# Patient Record
Sex: MALE | Race: WHITE | Employment: FULL TIME | URBAN - METROPOLITAN AREA
[De-identification: names, ages, dates, MRNs, and addresses within clinical notes are randomized per-mention and may not be internally consistent; named-entity substitution may affect disease eponyms.]

---

## 2020-01-10 ENCOUNTER — APPOINTMENT (OUTPATIENT)
Dept: GENERAL RADIOLOGY | Facility: CLINIC | Age: 50
DRG: 282 | End: 2020-01-10
Attending: EMERGENCY MEDICINE
Payer: COMMERCIAL

## 2020-01-10 ENCOUNTER — HOSPITAL ENCOUNTER (INPATIENT)
Facility: CLINIC | Age: 50
LOS: 1 days | Discharge: HOME | DRG: 282 | End: 2020-01-10
Attending: EMERGENCY MEDICINE | Admitting: INTERNAL MEDICINE
Payer: COMMERCIAL

## 2020-01-10 ENCOUNTER — APPOINTMENT (OUTPATIENT)
Dept: ULTRASOUND IMAGING | Facility: CLINIC | Age: 50
DRG: 282 | End: 2020-01-10
Attending: INTERNAL MEDICINE
Payer: COMMERCIAL

## 2020-01-10 ENCOUNTER — APPOINTMENT (OUTPATIENT)
Dept: CARDIOLOGY | Facility: CLINIC | Age: 50
DRG: 282 | End: 2020-01-10
Attending: INTERNAL MEDICINE
Payer: COMMERCIAL

## 2020-01-10 VITALS
HEART RATE: 75 BPM | WEIGHT: 205 LBS | SYSTOLIC BLOOD PRESSURE: 124 MMHG | HEIGHT: 68 IN | BODY MASS INDEX: 31.07 KG/M2 | OXYGEN SATURATION: 97 % | RESPIRATION RATE: 19 BRPM | TEMPERATURE: 98.7 F | DIASTOLIC BLOOD PRESSURE: 77 MMHG

## 2020-01-10 DIAGNOSIS — I25.9 CHEST PAIN DUE TO MYOCARDIAL ISCHEMIA, UNSPECIFIED ISCHEMIC CHEST PAIN TYPE: ICD-10-CM

## 2020-01-10 DIAGNOSIS — I10 ESSENTIAL HYPERTENSION: Primary | ICD-10-CM

## 2020-01-10 DIAGNOSIS — I20.0 UNSTABLE ANGINA (H): ICD-10-CM

## 2020-01-10 DIAGNOSIS — E78.5 HYPERLIPIDEMIA LDL GOAL <70: ICD-10-CM

## 2020-01-10 DIAGNOSIS — I21.4 NSTEMI (NON-ST ELEVATED MYOCARDIAL INFARCTION) (H): ICD-10-CM

## 2020-01-10 LAB
ALBUMIN SERPL-MCNC: 4.1 G/DL (ref 3.4–5)
ALP SERPL-CCNC: 86 U/L (ref 40–150)
ALT SERPL W P-5'-P-CCNC: 47 U/L (ref 0–70)
ANION GAP SERPL CALCULATED.3IONS-SCNC: 5 MMOL/L (ref 3–14)
AST SERPL W P-5'-P-CCNC: 36 U/L (ref 0–45)
BASOPHILS # BLD AUTO: 0 10E9/L (ref 0–0.2)
BASOPHILS NFR BLD AUTO: 0.3 %
BILIRUB SERPL-MCNC: 0.3 MG/DL (ref 0.2–1.3)
BUN SERPL-MCNC: 14 MG/DL (ref 7–30)
CALCIUM SERPL-MCNC: 8.9 MG/DL (ref 8.5–10.1)
CHLORIDE SERPL-SCNC: 105 MMOL/L (ref 94–109)
CHOLEST SERPL-MCNC: 247 MG/DL
CO2 SERPL-SCNC: 29 MMOL/L (ref 20–32)
CREAT SERPL-MCNC: 0.88 MG/DL (ref 0.66–1.25)
DIFFERENTIAL METHOD BLD: NORMAL
EOSINOPHIL # BLD AUTO: 0.3 10E9/L (ref 0–0.7)
EOSINOPHIL NFR BLD AUTO: 2.8 %
ERYTHROCYTE [DISTWIDTH] IN BLOOD BY AUTOMATED COUNT: 13.2 % (ref 10–15)
ERYTHROCYTE [DISTWIDTH] IN BLOOD BY AUTOMATED COUNT: 13.2 % (ref 10–15)
GFR SERPL CREATININE-BSD FRML MDRD: >90 ML/MIN/{1.73_M2}
GLUCOSE SERPL-MCNC: 98 MG/DL (ref 70–99)
HBA1C MFR BLD: 5.9 % (ref 0–5.6)
HCT VFR BLD AUTO: 41.6 % (ref 40–53)
HCT VFR BLD AUTO: 44 % (ref 40–53)
HDLC SERPL-MCNC: 44 MG/DL
HGB BLD-MCNC: 13.9 G/DL (ref 13.3–17.7)
HGB BLD-MCNC: 15 G/DL (ref 13.3–17.7)
IMM GRANULOCYTES # BLD: 0 10E9/L (ref 0–0.4)
IMM GRANULOCYTES NFR BLD: 0.3 %
INTERPRETATION ECG - MUSE: NORMAL
LDLC SERPL CALC-MCNC: 180 MG/DL
LIPASE SERPL-CCNC: 140 U/L (ref 73–393)
LMWH PPP CHRO-ACNC: 1.07 IU/ML
LYMPHOCYTES # BLD AUTO: 2.8 10E9/L (ref 0.8–5.3)
LYMPHOCYTES NFR BLD AUTO: 28.7 %
MCH RBC QN AUTO: 29.9 PG (ref 26.5–33)
MCH RBC QN AUTO: 30.4 PG (ref 26.5–33)
MCHC RBC AUTO-ENTMCNC: 33.4 G/DL (ref 31.5–36.5)
MCHC RBC AUTO-ENTMCNC: 34.1 G/DL (ref 31.5–36.5)
MCV RBC AUTO: 89 FL (ref 78–100)
MCV RBC AUTO: 90 FL (ref 78–100)
MONOCYTES # BLD AUTO: 0.9 10E9/L (ref 0–1.3)
MONOCYTES NFR BLD AUTO: 8.7 %
NEUTROPHILS # BLD AUTO: 5.8 10E9/L (ref 1.6–8.3)
NEUTROPHILS NFR BLD AUTO: 59.2 %
NONHDLC SERPL-MCNC: 203 MG/DL
NRBC # BLD AUTO: 0 10*3/UL
NRBC BLD AUTO-RTO: 0 /100
PLATELET # BLD AUTO: 250 10E9/L (ref 150–450)
PLATELET # BLD AUTO: 270 10E9/L (ref 150–450)
POTASSIUM SERPL-SCNC: 3.3 MMOL/L (ref 3.4–5.3)
PROT SERPL-MCNC: 8.1 G/DL (ref 6.8–8.8)
RBC # BLD AUTO: 4.65 10E12/L (ref 4.4–5.9)
RBC # BLD AUTO: 4.93 10E12/L (ref 4.4–5.9)
SODIUM SERPL-SCNC: 139 MMOL/L (ref 133–144)
TRIGL SERPL-MCNC: 116 MG/DL
TROPONIN I SERPL-MCNC: 2.11 UG/L (ref 0–0.04)
TROPONIN I SERPL-MCNC: 2.75 UG/L (ref 0–0.04)
TROPONIN I SERPL-MCNC: 3.08 UG/L (ref 0–0.04)
TSH SERPL DL<=0.005 MIU/L-ACNC: 1.61 MU/L (ref 0.4–4)
WBC # BLD AUTO: 10 10E9/L (ref 4–11)
WBC # BLD AUTO: 9.8 10E9/L (ref 4–11)

## 2020-01-10 PROCEDURE — 93306 TTE W/DOPPLER COMPLETE: CPT

## 2020-01-10 PROCEDURE — 99207 ZZC CDG-CODE CATEGORY CHANGED: CPT | Performed by: INTERNAL MEDICINE

## 2020-01-10 PROCEDURE — 85520 HEPARIN ASSAY: CPT | Performed by: HOSPITALIST

## 2020-01-10 PROCEDURE — 99285 EMERGENCY DEPT VISIT HI MDM: CPT | Mod: 25

## 2020-01-10 PROCEDURE — 25000125 ZZHC RX 250: Performed by: PHYSICIAN ASSISTANT

## 2020-01-10 PROCEDURE — 25000132 ZZH RX MED GY IP 250 OP 250 PS 637: Performed by: EMERGENCY MEDICINE

## 2020-01-10 PROCEDURE — 80053 COMPREHEN METABOLIC PANEL: CPT | Performed by: EMERGENCY MEDICINE

## 2020-01-10 PROCEDURE — 85025 COMPLETE CBC W/AUTO DIFF WBC: CPT | Performed by: EMERGENCY MEDICINE

## 2020-01-10 PROCEDURE — 71046 X-RAY EXAM CHEST 2 VIEWS: CPT

## 2020-01-10 PROCEDURE — 25000128 H RX IP 250 OP 636: Performed by: INTERNAL MEDICINE

## 2020-01-10 PROCEDURE — 99152 MOD SED SAME PHYS/QHP 5/>YRS: CPT | Performed by: INTERNAL MEDICINE

## 2020-01-10 PROCEDURE — C1894 INTRO/SHEATH, NON-LASER: HCPCS | Performed by: INTERNAL MEDICINE

## 2020-01-10 PROCEDURE — 93005 ELECTROCARDIOGRAM TRACING: CPT

## 2020-01-10 PROCEDURE — 93306 TTE W/DOPPLER COMPLETE: CPT | Mod: 26 | Performed by: INTERNAL MEDICINE

## 2020-01-10 PROCEDURE — 96376 TX/PRO/DX INJ SAME DRUG ADON: CPT

## 2020-01-10 PROCEDURE — 96361 HYDRATE IV INFUSION ADD-ON: CPT

## 2020-01-10 PROCEDURE — 93005 ELECTROCARDIOGRAM TRACING: CPT | Mod: 76

## 2020-01-10 PROCEDURE — 80061 LIPID PANEL: CPT | Performed by: INTERNAL MEDICINE

## 2020-01-10 PROCEDURE — 93880 EXTRACRANIAL BILAT STUDY: CPT

## 2020-01-10 PROCEDURE — 93458 L HRT ARTERY/VENTRICLE ANGIO: CPT | Performed by: INTERNAL MEDICINE

## 2020-01-10 PROCEDURE — 25000132 ZZH RX MED GY IP 250 OP 250 PS 637: Performed by: INTERNAL MEDICINE

## 2020-01-10 PROCEDURE — 93571 IV DOP VEL&/PRESS C FLO 1ST: CPT | Mod: 52 | Performed by: INTERNAL MEDICINE

## 2020-01-10 PROCEDURE — 84484 ASSAY OF TROPONIN QUANT: CPT | Performed by: EMERGENCY MEDICINE

## 2020-01-10 PROCEDURE — 25800030 ZZH RX IP 258 OP 636: Performed by: PHYSICIAN ASSISTANT

## 2020-01-10 PROCEDURE — 25500064 ZZH RX 255 OP 636: Performed by: INTERNAL MEDICINE

## 2020-01-10 PROCEDURE — 93572 IV DOP VEL&/PRESS C FLO EA: CPT | Performed by: INTERNAL MEDICINE

## 2020-01-10 PROCEDURE — 4A033BC MEASUREMENT OF ARTERIAL PRESSURE, CORONARY, PERCUTANEOUS APPROACH: ICD-10-PCS | Performed by: INTERNAL MEDICINE

## 2020-01-10 PROCEDURE — B2151ZZ FLUOROSCOPY OF LEFT HEART USING LOW OSMOLAR CONTRAST: ICD-10-PCS | Performed by: INTERNAL MEDICINE

## 2020-01-10 PROCEDURE — 25000125 ZZHC RX 250: Performed by: INTERNAL MEDICINE

## 2020-01-10 PROCEDURE — 4A023N7 MEASUREMENT OF CARDIAC SAMPLING AND PRESSURE, LEFT HEART, PERCUTANEOUS APPROACH: ICD-10-PCS | Performed by: INTERNAL MEDICINE

## 2020-01-10 PROCEDURE — 99235 HOSP IP/OBS SAME DATE MOD 70: CPT | Performed by: INTERNAL MEDICINE

## 2020-01-10 PROCEDURE — 36415 COLL VENOUS BLD VENIPUNCTURE: CPT | Performed by: INTERNAL MEDICINE

## 2020-01-10 PROCEDURE — 27210794 ZZH OR GENERAL SUPPLY STERILE: Performed by: INTERNAL MEDICINE

## 2020-01-10 PROCEDURE — 99222 1ST HOSP IP/OBS MODERATE 55: CPT | Mod: 25 | Performed by: INTERNAL MEDICINE

## 2020-01-10 PROCEDURE — 85027 COMPLETE CBC AUTOMATED: CPT | Performed by: INTERNAL MEDICINE

## 2020-01-10 PROCEDURE — 83690 ASSAY OF LIPASE: CPT | Performed by: EMERGENCY MEDICINE

## 2020-01-10 PROCEDURE — 93010 ELECTROCARDIOGRAM REPORT: CPT | Performed by: INTERNAL MEDICINE

## 2020-01-10 PROCEDURE — 84443 ASSAY THYROID STIM HORMONE: CPT | Performed by: INTERNAL MEDICINE

## 2020-01-10 PROCEDURE — 25000132 ZZH RX MED GY IP 250 OP 250 PS 637: Performed by: PHYSICIAN ASSISTANT

## 2020-01-10 PROCEDURE — B2111ZZ FLUOROSCOPY OF MULTIPLE CORONARY ARTERIES USING LOW OSMOLAR CONTRAST: ICD-10-PCS | Performed by: INTERNAL MEDICINE

## 2020-01-10 PROCEDURE — C1769 GUIDE WIRE: HCPCS | Performed by: INTERNAL MEDICINE

## 2020-01-10 PROCEDURE — 4A0335C MEASUREMENT OF ARTERIAL FLOW, CORONARY, PERCUTANEOUS APPROACH: ICD-10-PCS | Performed by: INTERNAL MEDICINE

## 2020-01-10 PROCEDURE — 83036 HEMOGLOBIN GLYCOSYLATED A1C: CPT | Performed by: INTERNAL MEDICINE

## 2020-01-10 PROCEDURE — 21000000 ZZH R&B IMCU HEART CARE

## 2020-01-10 PROCEDURE — 25800030 ZZH RX IP 258 OP 636: Performed by: EMERGENCY MEDICINE

## 2020-01-10 PROCEDURE — 99153 MOD SED SAME PHYS/QHP EA: CPT

## 2020-01-10 PROCEDURE — 96365 THER/PROPH/DIAG IV INF INIT: CPT

## 2020-01-10 PROCEDURE — 25800030 ZZH RX IP 258 OP 636: Performed by: INTERNAL MEDICINE

## 2020-01-10 PROCEDURE — 84484 ASSAY OF TROPONIN QUANT: CPT | Performed by: INTERNAL MEDICINE

## 2020-01-10 PROCEDURE — 36415 COLL VENOUS BLD VENIPUNCTURE: CPT | Performed by: HOSPITALIST

## 2020-01-10 PROCEDURE — 25000128 H RX IP 250 OP 636: Performed by: EMERGENCY MEDICINE

## 2020-01-10 RX ORDER — NALOXONE HYDROCHLORIDE 0.4 MG/ML
.1-.4 INJECTION, SOLUTION INTRAMUSCULAR; INTRAVENOUS; SUBCUTANEOUS
Status: DISCONTINUED | OUTPATIENT
Start: 2020-01-10 | End: 2020-01-10 | Stop reason: HOSPADM

## 2020-01-10 RX ORDER — LORAZEPAM 2 MG/ML
0.5 INJECTION INTRAMUSCULAR
Status: DISCONTINUED | OUTPATIENT
Start: 2020-01-10 | End: 2020-01-10

## 2020-01-10 RX ORDER — PROCHLORPERAZINE 25 MG
25 SUPPOSITORY, RECTAL RECTAL EVERY 12 HOURS PRN
Status: DISCONTINUED | OUTPATIENT
Start: 2020-01-10 | End: 2020-01-10 | Stop reason: HOSPADM

## 2020-01-10 RX ORDER — ASPIRIN 81 MG/1
81 TABLET ORAL DAILY
Status: DISCONTINUED | OUTPATIENT
Start: 2020-01-10 | End: 2020-01-10 | Stop reason: HOSPADM

## 2020-01-10 RX ORDER — SODIUM CHLORIDE 9 MG/ML
INJECTION, SOLUTION INTRAVENOUS CONTINUOUS
Status: DISCONTINUED | OUTPATIENT
Start: 2020-01-10 | End: 2020-01-10 | Stop reason: HOSPADM

## 2020-01-10 RX ORDER — LISINOPRIL 10 MG/1
10 TABLET ORAL DAILY
Qty: 30 TABLET | Refills: 0 | Status: SHIPPED | OUTPATIENT
Start: 2020-01-11 | End: 2020-01-22

## 2020-01-10 RX ORDER — ROSUVASTATIN CALCIUM 20 MG/1
20 TABLET, COATED ORAL DAILY
Status: DISCONTINUED | OUTPATIENT
Start: 2020-01-10 | End: 2020-01-10

## 2020-01-10 RX ORDER — OXYCODONE HYDROCHLORIDE 5 MG/1
5-10 TABLET ORAL
Status: DISCONTINUED | OUTPATIENT
Start: 2020-01-10 | End: 2020-01-10 | Stop reason: HOSPADM

## 2020-01-10 RX ORDER — ONDANSETRON 4 MG/1
4 TABLET, ORALLY DISINTEGRATING ORAL EVERY 6 HOURS PRN
Status: DISCONTINUED | OUTPATIENT
Start: 2020-01-10 | End: 2020-01-10 | Stop reason: HOSPADM

## 2020-01-10 RX ORDER — NITROGLYCERIN 0.4 MG/1
TABLET SUBLINGUAL
Qty: 20 TABLET | Refills: 0 | Status: SHIPPED | OUTPATIENT
Start: 2020-01-10

## 2020-01-10 RX ORDER — CLOPIDOGREL BISULFATE 75 MG/1
TABLET ORAL
Status: DISCONTINUED | OUTPATIENT
Start: 2020-01-10 | End: 2020-01-10

## 2020-01-10 RX ORDER — HEPARIN SODIUM 10000 [USP'U]/100ML
950 INJECTION, SOLUTION INTRAVENOUS CONTINUOUS
Status: DISCONTINUED | OUTPATIENT
Start: 2020-01-10 | End: 2020-01-10

## 2020-01-10 RX ORDER — LISINOPRIL 10 MG/1
10 TABLET ORAL DAILY
Status: DISCONTINUED | OUTPATIENT
Start: 2020-01-10 | End: 2020-01-10 | Stop reason: HOSPADM

## 2020-01-10 RX ORDER — ATORVASTATIN CALCIUM 80 MG/1
80 TABLET, FILM COATED ORAL EVERY EVENING
Status: DISCONTINUED | OUTPATIENT
Start: 2020-01-10 | End: 2020-01-10

## 2020-01-10 RX ORDER — FENTANYL CITRATE 50 UG/ML
25-50 INJECTION, SOLUTION INTRAMUSCULAR; INTRAVENOUS
Status: DISCONTINUED | OUTPATIENT
Start: 2020-01-10 | End: 2020-01-10 | Stop reason: HOSPADM

## 2020-01-10 RX ORDER — CLOPIDOGREL BISULFATE 75 MG/1
75 TABLET ORAL DAILY
Qty: 30 TABLET | Refills: 0 | Status: SHIPPED | OUTPATIENT
Start: 2020-01-11 | End: 2020-01-22

## 2020-01-10 RX ORDER — LIDOCAINE 40 MG/G
CREAM TOPICAL
Status: DISCONTINUED | OUTPATIENT
Start: 2020-01-10 | End: 2020-01-10 | Stop reason: HOSPADM

## 2020-01-10 RX ORDER — LORAZEPAM 0.5 MG/1
0.5 TABLET ORAL
Status: DISCONTINUED | OUTPATIENT
Start: 2020-01-10 | End: 2020-01-10

## 2020-01-10 RX ORDER — NITROGLYCERIN 5 MG/ML
VIAL (ML) INTRAVENOUS
Status: DISCONTINUED | OUTPATIENT
Start: 2020-01-10 | End: 2020-01-10

## 2020-01-10 RX ORDER — SIMVASTATIN 40 MG
40 TABLET ORAL EVERY EVENING
Status: DISCONTINUED | OUTPATIENT
Start: 2020-01-10 | End: 2020-01-10

## 2020-01-10 RX ORDER — FLUMAZENIL 0.1 MG/ML
0.2 INJECTION, SOLUTION INTRAVENOUS
Status: DISCONTINUED | OUTPATIENT
Start: 2020-01-10 | End: 2020-01-10 | Stop reason: HOSPADM

## 2020-01-10 RX ORDER — ACETAMINOPHEN 650 MG/1
650 SUPPOSITORY RECTAL EVERY 4 HOURS PRN
Status: DISCONTINUED | OUTPATIENT
Start: 2020-01-10 | End: 2020-01-10 | Stop reason: HOSPADM

## 2020-01-10 RX ORDER — ALUMINA, MAGNESIA, AND SIMETHICONE 2400; 2400; 240 MG/30ML; MG/30ML; MG/30ML
30 SUSPENSION ORAL EVERY 4 HOURS PRN
Status: DISCONTINUED | OUTPATIENT
Start: 2020-01-10 | End: 2020-01-10 | Stop reason: HOSPADM

## 2020-01-10 RX ORDER — IOPAMIDOL 755 MG/ML
INJECTION, SOLUTION INTRAVASCULAR
Status: DISCONTINUED | OUTPATIENT
Start: 2020-01-10 | End: 2020-01-10 | Stop reason: HOSPADM

## 2020-01-10 RX ORDER — NALOXONE HYDROCHLORIDE 0.4 MG/ML
.1-.4 INJECTION, SOLUTION INTRAMUSCULAR; INTRAVENOUS; SUBCUTANEOUS
Status: DISCONTINUED | OUTPATIENT
Start: 2020-01-10 | End: 2020-01-10

## 2020-01-10 RX ORDER — ONDANSETRON 2 MG/ML
4 INJECTION INTRAMUSCULAR; INTRAVENOUS EVERY 6 HOURS PRN
Status: DISCONTINUED | OUTPATIENT
Start: 2020-01-10 | End: 2020-01-10 | Stop reason: HOSPADM

## 2020-01-10 RX ORDER — ACETAMINOPHEN 325 MG/1
650 TABLET ORAL EVERY 4 HOURS PRN
Status: DISCONTINUED | OUTPATIENT
Start: 2020-01-10 | End: 2020-01-10

## 2020-01-10 RX ORDER — HEPARIN SODIUM 1000 [USP'U]/ML
INJECTION, SOLUTION INTRAVENOUS; SUBCUTANEOUS
Status: DISCONTINUED | OUTPATIENT
Start: 2020-01-10 | End: 2020-01-10

## 2020-01-10 RX ORDER — POTASSIUM CHLORIDE 1500 MG/1
20 TABLET, EXTENDED RELEASE ORAL
Status: DISCONTINUED | OUTPATIENT
Start: 2020-01-10 | End: 2020-01-10

## 2020-01-10 RX ORDER — BISACODYL 10 MG
10 SUPPOSITORY, RECTAL RECTAL DAILY PRN
Status: DISCONTINUED | OUTPATIENT
Start: 2020-01-10 | End: 2020-01-10 | Stop reason: HOSPADM

## 2020-01-10 RX ORDER — NITROGLYCERIN 0.4 MG/1
0.4 TABLET SUBLINGUAL EVERY 5 MIN PRN
Status: DISCONTINUED | OUTPATIENT
Start: 2020-01-10 | End: 2020-01-10 | Stop reason: HOSPADM

## 2020-01-10 RX ORDER — ATROPINE SULFATE 0.1 MG/ML
0.5 INJECTION INTRAVENOUS EVERY 5 MIN PRN
Status: DISCONTINUED | OUTPATIENT
Start: 2020-01-10 | End: 2020-01-10 | Stop reason: HOSPADM

## 2020-01-10 RX ORDER — AMOXICILLIN 250 MG
1 CAPSULE ORAL 2 TIMES DAILY PRN
Status: DISCONTINUED | OUTPATIENT
Start: 2020-01-10 | End: 2020-01-10 | Stop reason: HOSPADM

## 2020-01-10 RX ORDER — ACETAMINOPHEN 325 MG/1
650 TABLET ORAL EVERY 4 HOURS PRN
Status: DISCONTINUED | OUTPATIENT
Start: 2020-01-10 | End: 2020-01-10 | Stop reason: HOSPADM

## 2020-01-10 RX ORDER — FENTANYL CITRATE 50 UG/ML
INJECTION, SOLUTION INTRAMUSCULAR; INTRAVENOUS
Status: DISCONTINUED | OUTPATIENT
Start: 2020-01-10 | End: 2020-01-10

## 2020-01-10 RX ORDER — NALOXONE HYDROCHLORIDE 0.4 MG/ML
.2-.4 INJECTION, SOLUTION INTRAMUSCULAR; INTRAVENOUS; SUBCUTANEOUS
Status: DISCONTINUED | OUTPATIENT
Start: 2020-01-10 | End: 2020-01-10

## 2020-01-10 RX ORDER — ASPIRIN 81 MG/1
81 TABLET, CHEWABLE ORAL DAILY
Status: DISCONTINUED | OUTPATIENT
Start: 2020-01-10 | End: 2020-01-10

## 2020-01-10 RX ORDER — VERAPAMIL HYDROCHLORIDE 2.5 MG/ML
INJECTION, SOLUTION INTRAVENOUS
Status: DISCONTINUED | OUTPATIENT
Start: 2020-01-10 | End: 2020-01-10

## 2020-01-10 RX ORDER — LIDOCAINE 40 MG/G
CREAM TOPICAL
Status: DISCONTINUED | OUTPATIENT
Start: 2020-01-10 | End: 2020-01-10

## 2020-01-10 RX ORDER — ROSUVASTATIN CALCIUM 20 MG/1
40 TABLET, COATED ORAL DAILY
Status: DISCONTINUED | OUTPATIENT
Start: 2020-01-10 | End: 2020-01-10 | Stop reason: HOSPADM

## 2020-01-10 RX ORDER — PROCHLORPERAZINE MALEATE 10 MG
10 TABLET ORAL EVERY 6 HOURS PRN
Status: DISCONTINUED | OUTPATIENT
Start: 2020-01-10 | End: 2020-01-10 | Stop reason: HOSPADM

## 2020-01-10 RX ORDER — AMOXICILLIN 250 MG
2 CAPSULE ORAL 2 TIMES DAILY PRN
Status: DISCONTINUED | OUTPATIENT
Start: 2020-01-10 | End: 2020-01-10 | Stop reason: HOSPADM

## 2020-01-10 RX ORDER — METOPROLOL SUCCINATE 50 MG/1
50 TABLET, EXTENDED RELEASE ORAL DAILY
Status: DISCONTINUED | OUTPATIENT
Start: 2020-01-10 | End: 2020-01-10 | Stop reason: HOSPADM

## 2020-01-10 RX ORDER — POLYETHYLENE GLYCOL 3350 17 G/17G
17 POWDER, FOR SOLUTION ORAL DAILY PRN
Status: DISCONTINUED | OUTPATIENT
Start: 2020-01-10 | End: 2020-01-10 | Stop reason: HOSPADM

## 2020-01-10 RX ORDER — ASPIRIN 81 MG/1
324 TABLET, CHEWABLE ORAL ONCE
Status: DISCONTINUED | OUTPATIENT
Start: 2020-01-10 | End: 2020-01-10

## 2020-01-10 RX ORDER — METOPROLOL SUCCINATE 50 MG/1
50 TABLET, EXTENDED RELEASE ORAL DAILY
Qty: 30 TABLET | Refills: 0 | Status: SHIPPED | OUTPATIENT
Start: 2020-01-11 | End: 2020-01-22

## 2020-01-10 RX ORDER — LISINOPRIL 5 MG/1
5 TABLET ORAL DAILY
Status: DISCONTINUED | OUTPATIENT
Start: 2020-01-10 | End: 2020-01-10

## 2020-01-10 RX ORDER — NITROGLYCERIN 0.4 MG/1
0.4 TABLET SUBLINGUAL EVERY 5 MIN PRN
Status: DISCONTINUED | OUTPATIENT
Start: 2020-01-10 | End: 2020-01-10

## 2020-01-10 RX ORDER — ROSUVASTATIN CALCIUM 40 MG/1
40 TABLET, COATED ORAL DAILY
Qty: 30 TABLET | Refills: 0 | Status: SHIPPED | OUTPATIENT
Start: 2020-01-11 | End: 2020-01-22

## 2020-01-10 RX ORDER — CLOPIDOGREL BISULFATE 75 MG/1
75 TABLET ORAL DAILY
Status: DISCONTINUED | OUTPATIENT
Start: 2020-01-11 | End: 2020-01-10 | Stop reason: HOSPADM

## 2020-01-10 RX ORDER — POTASSIUM CHLORIDE 1.5 G/1.58G
20 POWDER, FOR SOLUTION ORAL ONCE
Status: COMPLETED | OUTPATIENT
Start: 2020-01-10 | End: 2020-01-10

## 2020-01-10 RX ORDER — METOPROLOL SUCCINATE 25 MG/1
25 TABLET, EXTENDED RELEASE ORAL DAILY
Status: DISCONTINUED | OUTPATIENT
Start: 2020-01-10 | End: 2020-01-10

## 2020-01-10 RX ORDER — SODIUM CHLORIDE 9 MG/ML
INJECTION, SOLUTION INTRAVENOUS CONTINUOUS
Status: DISCONTINUED | OUTPATIENT
Start: 2020-01-10 | End: 2020-01-10

## 2020-01-10 RX ADMIN — SODIUM CHLORIDE 1000 ML: 9 INJECTION, SOLUTION INTRAVENOUS at 02:16

## 2020-01-10 RX ADMIN — METOPROLOL SUCCINATE 50 MG: 50 TABLET, EXTENDED RELEASE ORAL at 12:06

## 2020-01-10 RX ADMIN — NITROGLYCERIN 0.4 MG: 0.4 TABLET SUBLINGUAL at 02:08

## 2020-01-10 RX ADMIN — POTASSIUM CHLORIDE 20 MEQ: 1.5 POWDER, FOR SOLUTION ORAL at 02:34

## 2020-01-10 RX ADMIN — Medication 4500 UNITS: at 03:01

## 2020-01-10 RX ADMIN — ASPIRIN 325 MG: 325 TABLET, COATED ORAL at 08:53

## 2020-01-10 RX ADMIN — HUMAN ALBUMIN MICROSPHERES AND PERFLUTREN 9 ML: 10; .22 INJECTION, SOLUTION INTRAVENOUS at 14:23

## 2020-01-10 RX ADMIN — ROSUVASTATIN CALCIUM 40 MG: 20 TABLET, FILM COATED ORAL at 12:06

## 2020-01-10 RX ADMIN — SODIUM CHLORIDE: 9 INJECTION, SOLUTION INTRAVENOUS at 08:54

## 2020-01-10 RX ADMIN — NITROGLYCERIN 0.4 MG: 0.4 TABLET SUBLINGUAL at 02:03

## 2020-01-10 RX ADMIN — LISINOPRIL 10 MG: 10 TABLET ORAL at 12:06

## 2020-01-10 RX ADMIN — HEPARIN SODIUM 950 UNITS/HR: 10000 INJECTION, SOLUTION INTRAVENOUS at 03:06

## 2020-01-10 ASSESSMENT — ENCOUNTER SYMPTOMS
FEVER: 0
COUGH: 0
CHEST TIGHTNESS: 1
NAUSEA: 0
RHINORRHEA: 0
SHORTNESS OF BREATH: 0
DIAPHORESIS: 0

## 2020-01-10 ASSESSMENT — ACTIVITIES OF DAILY LIVING (ADL)
ADLS_ACUITY_SCORE: 15

## 2020-01-10 ASSESSMENT — MIFFLIN-ST. JEOR: SCORE: 1774.37

## 2020-01-10 NOTE — CONSULTS
[facility] Cardiology Consultation     Date of Admission:  1/10/2020  Date of Consult: 01/10/20  Requesting Physician: Dr. Lorenzana  Primary care physician: Physician No Ref-Primary  Primary cardiologist: None  Staff Cardiologist:  Dr. Helton     Reason for consult: NSTEMI    Assessment:   Stepan Pablo is a 49 year old male who was admitted on 1/10/2020 with chest pain typical for unstable angina and troponin rise up to 3 this morning.    1. NSTEMI - no known CAD. Initial EKG with ST depression in lead III alone. Currently pain-free following SL NTG on arrival. Remains on heparin drip. TTE pending.   2. Hypertension - reports controlled as outpatient; elevated here.  3. Hyperlipidemia - , untreated.   4. Obesity - Body mass index is 31.17 kg/m .   5. Hypokalemia - 3.3 this AM, being replaced.   6. Never-smoker.     Plan:  1. LHC, possible PCI recommended. Risks and benefits of left heart catheterization and coronary angiogram were discussed with the patient in detail. 0.1-0.3% (for diagnostic angio) and 1-2% (for PCI)  risk of stroke, MI, death, emergent bypass for diagnostic angio, risk of contrast induced allergic reaction, renal dysfunction, vascular complications were discussed. Patient understands and wishes to proceed with it.  Further recommendations based on the results of coronary angiography. The patient would be an appropriate candidate for DAPT, if required.   2. Continue aspirin, beta blocker, ACEi, high intensity statin. Stop heparin on arrival to cath lab.   3. Repeat EKG now. Trend trop to peak.  4. Will review TTE once available.   5. Carotid ultrasound given soft bilateral bruits.   6. Up-titrate lisinopril as needed for BP control.   7. Will arrange follow up with our clinic.    Thank you very much for this consultation.     Yasemin Pinzon PA-C  [facility]  Pager: [phone]  Text Page  (7:30am - 4pm M-F)  "  ________________________________________________________________________  History of Present Illness   Stepan Pablo is a 49 year old male with a PMH of hypertension treated with amlodipine, who presents with acute-onset chest pain initiating yesterday while playing hockey with his children. He suddenly developed a L-sided chest tightness which radiated down his left arm, rated a 5/10 in severity. Denies associated symptoms. This slowly diminished over a period of hours however when it persisted at a low level throughout the night he sought care in the ED. Troponin was initially elevated at 2.1, and hector to 3 approximately four hours later. The remainder of his labs were notable for a potassium of 3.3, and an LDL of 180. The only EKG I can see in the chart has some ST depression/TW inversion in lead III alone, otherwise unremarkable. His pain resolved completely after SL NTG x 2. He was placed on a heparin drip, beta blocker, ACEI, statin and aspirin.     He is currently comfortable in bed. His pain has not returned. He states he had some \"twinges\" in his chest on New Year's Day, associated with anxiety, but otherwise denies a history of chest pain. He denies history of kidney disease, diabetes, or GI bleeds. He has never used tobacco. He has no contributory family history. He lives at home with his wife and two children, and is active at baseline.    Code Status    Full Code    Past Medical History   I have reviewed this patient's medical history and updated it with pertinent information if needed.   Past Medical History:   Diagnosis Date     Hyperlipidemia      Hypertension        Past Surgical History   I have reviewed this patient's surgical history and updated it with pertinent information if needed.  Past Surgical History:   Procedure Laterality Date     ENT SURGERY      abcess in jaw and teeth pulled     HERNIORRHAPHY UMBILICAL N/A 10/12/2016    Procedure: HERNIORRHAPHY UMBILICAL;  Surgeon: Parker Alaniz " MD Raul;  Location:  OR       Prior to Admission Medications   Prior to Admission Medications   Prescriptions Last Dose Informant Patient Reported? Taking?   AMLODIPINE BESYLATE PO   Yes No   Sig: Take 5 mg by mouth   ATORVASTATIN CALCIUM PO   Yes No   Sig: Take 20 mg by mouth daily   FLONASE INHA 50 MCG/DOSE NA   Yes No   Sig: None Entered   Fexofenadine HCl (ALLEGRA PO)   Yes No   HYDROcodone-acetaminophen (NORCO) 5-325 MG per tablet   No No   Sig: Take 1-2 tablets by mouth every 4 hours as needed for other (Moderate to Severe Pain)      Facility-Administered Medications: None     Allergies   No Known Allergies    Social History   I have reviewed this patient's social history and updated it with pertinent information if needed. Stepan Pablo  reports that he has never smoked. He has never used smokeless tobacco. He reports current alcohol use. He reports that he does not use drugs.    Family History   I have reviewed this patient's family history and updated it with pertinent information if needed.   Family History   Problem Relation Age of Onset     Thyroid Disease Sister      Heart Disease Maternal Grandmother        Review of Systems   The 10 point Review of Systems is negative other than noted in the HPI or here.     Physical Exam   Temp: 98.6  F (37  C) Temp src: Oral BP: 138/78 Pulse: 81 Heart Rate: 71 Resp: 18 SpO2: 96 % O2 Device: None (Room air)    Vital Signs with Ranges  Temp:  [97.5  F (36.4  C)-98.6  F (37  C)] 98.6  F (37  C)  Pulse:  [48-86] 81  Heart Rate:  [50-84] 71  Resp:  [5-25] 18  BP: (110-188)/() 138/78  SpO2:  [95 %-100 %] 96 %  205 lbs 0 oz    GENERAL:  Alert, oriented, in no apparent distress.   HEENT: NC/AT, sclera non-icteric, teeth in normal repair   NECK: CVP appears normal. Soft bilateral carotid bruits are appreciated.   PULMONARY:  There is a normal respiratory effort. Clear lungs to auscultation bilaterally.   CHEST: Non-tender, normal A/P diameter  CARDIOVASCULAR:   RRR, normal S1 S2, no m/r/g,   GI:  Non tender abdomen with normoactive bowel sounds and no hepatosplenomegaly. There are no masses palpable.   EXTREMITIES:  No clubbing, cyanosis or edema  VASCULAR: 2+ Pulses bilaterally in upper and lower extremities   NEURO: No gross motor or sensory deficits.   PSYCH: Appropriate affect  DERM: No rashes or lesions    Data   Most Recent 3 CBC's:  Recent Labs   Lab Test 01/10/20  0509 01/10/20  0153   WBC 10.0 9.8   HGB 13.9 15.0   MCV 90 89    270     Most Recent 3 BMP's:  Recent Labs   Lab Test 01/10/20  0153 10/12/16  0954     --    POTASSIUM 3.3*  --    CHLORIDE 105  --    CO2 29  --    BUN 14  --    CR 0.88 0.97   ANIONGAP 5  --    NANDINI 8.9  --    GLC 98  --      Most Recent 3 Troponin's:  Recent Labs   Lab Test 01/10/20  0509 01/10/20  0153   TROPI 3.085* 2.113*     Most Recent 3 BNP's:No lab results found.  Most Recent Cholesterol Panel:  Recent Labs   Lab Test 01/10/20  0509   CHOL 247*   *   HDL 44   TRIG 116     Most Recent TSH and T4:  Recent Labs   Lab Test 01/10/20  0509   TSH 1.61     Most Recent Hemoglobin A1c:  Recent Labs   Lab Test 01/10/20  0509   A1C 5.9*

## 2020-01-10 NOTE — PLAN OF CARE
A&Ox4. Denies chest pain/SOB/dizziness. Tele NSR. Regular diet, tolerating. Echo completed. L radial site, WDL. Air in band currently at 2mL. Wife at bedside.  Nursing will continue to monitor.

## 2020-01-10 NOTE — PROGRESS NOTES
Hospitalist brief udpate note:    Patient was seen and examined after he came back from angiogram. Denies chest pain or dyspnea.   Angiogram report noted, small vessel disease, no stents placed, cardiology recommending medical management.  On ASA, Plavix, BB ACEI and statin.  Discharge when ok with cardiology.    Discharge Med Rec completed.   Discussed with patient and his wife.    Hernan Collazo MD  Hospitalist.

## 2020-01-10 NOTE — PROVIDER NOTIFICATION
MD Notification    Notified Person: MD    Notified Person Name: Dr. Collazo    Notification Date/Time: 1/10/2020 at 1218     Notification Interaction: Web page    Purpose of Notification: Hep 10A 1.07 after bolus during angio. Heparin stopped this AM at 0900. Please advise.    Orders Received: Per MD, no further intervention needed at this time.    Comments:

## 2020-01-10 NOTE — PROGRESS NOTES
RECEIVING UNIT ED HANDOFF REVIEW    ED Nurse Handoff Report was reviewed by: Geneva Blackman RN on January 10, 2020 at 3:00 AM

## 2020-01-10 NOTE — PHARMACY-ADMISSION MEDICATION HISTORY
Pharmacy Medication History  Admission medication history interview status for the 1/10/2020  admission is complete. See EPIC admission navigator for prior to admission medications     Medication history sources: Patient and Care Everywhere  Medication history source reliability: Good  Adherence assessment: Good    Significant changes made to the medication list:  Removed atorvastatin      Additional medication history information:   none    Medication reconciliation completed by provider prior to medication history? Yes    Time spent in this activity: 10 min      Prior to Admission medications    Medication Sig Last Dose Taking? Auth Provider   AMLODIPINE BESYLATE PO Take 5 mg by mouth At Bedtime  1/8/2020 Yes Reported, Patient   Fexofenadine HCl (ALLEGRA PO) Take 180 mg by mouth At Bedtime  1/8/2020 Yes Reported, Patient   FLONASE INHA 50 MCG/DOSE NA Spray 1 spray in nostril At Bedtime  1/8/2020 at Unknown time Yes Reported, Patient   ketotifen (ZADITOR/REFRESH ANTI-ITCH) 0.025 % ophthalmic solution Place 1 drop into both eyes At Bedtime 1/8/2020 Yes Unknown, Entered By History

## 2020-01-10 NOTE — H&P
[facility]    History and Physical - Hospitalist Service       Date of Admission:  1/10/2020    Assessment & Plan   Stepan Pablo is a 49 year old male admitted on 1/10/2020. He presents to the emergency department for evaluation of persistent chest tightness and left arm aching after strenuous physical activity.  Found to have an elevated troponin and relief of chest discomfort with nitroglycerin.    Non-ST elevation myocardial infarction: Suspect plaque rupture with incomplete occlusion given patient's history of hyperlipidemia, no prior exertional symptoms historically.  -Completing troponin trend  -TTE pending  -Cardiology consulted  -N.p.o. anticipating coronary angiography later today  -Heparin drip  -Daily 81 mg aspirin.  Patient took 325 mg prior to presentation  -Cardiac telemetry  -Lipid panel, hemoglobin A1c, TSH pending  -Simvastatin 40 mg daily  -Discontinuing prior to admission amlodipine in favor of up titration of metoprolol 50 mg XL daily and lisinopril 10 mg daily    Essential hypertension: Managed outpatient with amlodipine, and currently well controlled.  -As above, discontinuing prior to patient amlodipine in favor of up titration of metoprolol and lisinopril as able.  This was discussed with patient on admission    Hyperlipidemia:   -repeat lipid panel pending  -Simvastatin 40 mg daily       Diet: N.p.o. per anesthesia guidelines  DVT Prophylaxis: Heparin drip  Pugh Catheter: not present  Code Status: Full code    Disposition Plan   Expected discharge: Tomorrow, recommended to prior living arrangement once Cardiac work-up complete.  Entered: Joseph Lorenzana MD 01/10/2020, 3:35 AM     The patient's care was discussed with the Patient and Dr. iDaz in the emergency department.    Joseph Lorenzana MD  [facility]    ______________________________________________________________________    Chief Complaint   Chest tightness, left arm aching    History is  obtained from patient, chart review, discussion with Dr. Diaz in the emergency department, review of outside records including history of vasovagal syncope in the setting of volume depletion with OMFS drain removal    History of Present Illness   Stepan Pablo is a 49 year old male who presents to the emergency department for assessment of central chest tightness and aching pain in his left arm and shoulder precipitated by vigorous activity and is found to have an elevated troponin concerning for non-ST elevation myocardial infarction.    Tonight, after vigorously ice skating with his daughter while his sons played hockey, patient developed central chest tightness and left arm aching.  He did not feel overly short of breath.  He was sweaty, though thought this was secondary to his degree of exertion.  This occurred around 6:30 PM, and symptoms persisted despite stopping ice-skating to watch the hockey game/practice.  When symptoms continued to persist at home, patient took 325 mg of aspirin, and presented to the emergency department for evaluation where he was found to have an elevated troponin concerning for non-ST elevation myocardial infarction.  EKG demonstrated T wave inversion in III which has been present historically, flat/inverted T wave in V1 and otherwise normal sinus rhythm.     Patient has no personal history of heart disease, though does have a history of hypertension on amlodipine as well as hyperlipidemia for which he had historically been on simvastatin therapy and was subsequently taken off.  Is a non-smoker, no history of diabetes.  He reports that he has a family history of early heart disease in his paternal grandfather, who  at age 50 of a heart attack.  Patient drinks alcohol, tells me he drinks approximately 10 beers per week.  No history of withdrawal.    Patient is a , not particularly active at baseline since the summer.  In the summer months, however, he would  take his dogs for a brisk walk/jog daily for approximately 1 hour, covering several miles with this.  No chest pain with this, though would become diaphoretic from the degree of exercise.  Much less active over the fall and winter, essentially telling me that his most vigorous activity is walking the aisles of the grocery store when he shops.    In the emergency department, patient received 2 doses of nitroglycerin.  Following this, his chest pain resolved, though he became presyncopal with a drop in his blood pressure.    Review of Systems    The 10 point Review of Systems is negative other than noted in the HPI or here.  No fevers or chills  Has been feeling in his usual state of health and preceding days  No prior history of similar chest discomfort with exertion  No shortness of breath    Past Medical History    I have reviewed this patient's medical history and updated it with pertinent information if needed.   Past Medical History:   Diagnosis Date     Hyperlipidemia      Hypertension        Past Surgical History   I have reviewed this patient's surgical history and updated it with pertinent information if needed.  Past Surgical History:   Procedure Laterality Date     ENT SURGERY      abcess in jaw and teeth pulled     HERNIORRHAPHY UMBILICAL N/A 10/12/2016    Procedure: HERNIORRHAPHY UMBILICAL;  Surgeon: Parker Alaniz MD;  Location:  OR       Social History   I have reviewed this patient's social history and updated it with pertinent information if needed.  Social History     Tobacco Use     Smoking status: Never Smoker     Smokeless tobacco: Never Used   Substance Use Topics     Alcohol use: Yes     Comment: occ     Drug use: No       Family History   I have reviewed this patient's family history and updated it with pertinent information if needed.   Family History   Problem Relation Age of Onset     Thyroid Disease Sister      Heart Disease Maternal Grandmother    Paternal grandfather with  myocardial infarction at age 50    Prior to Admission Medications   Prior to Admission Medications   Prescriptions Last Dose Informant Patient Reported? Taking?   AMLODIPINE BESYLATE PO   Yes No   Sig: Take 5 mg by mouth   ATORVASTATIN CALCIUM PO   Yes No   Sig: Take 20 mg by mouth daily   FLONASE INHA 50 MCG/DOSE NA   Yes No   Sig: None Entered   Fexofenadine HCl (ALLEGRA PO)   Yes No   HYDROcodone-acetaminophen (NORCO) 5-325 MG per tablet   No No   Sig: Take 1-2 tablets by mouth every 4 hours as needed for other (Moderate to Severe Pain)      Facility-Administered Medications: None     Allergies   No Known Allergies    Physical Exam   Vital Signs: Temp: 97.5  F (36.4  C) Temp src: Temporal BP: (!) 148/91 Pulse: 61 Heart Rate: 80 Resp: 18 SpO2: 96 % O2 Device: None (Room air)    Weight: 205 lbs 0 oz    General Appearance: Well-appearing middle-aged male resting comfortably on hospitals  Eyes: No scleral icterus or injection  HEENT: Normocephalic  Respiratory: Breath sounds are pristine bilaterally without wheeze or crackles.  Good effort.  Cardiovascular: Regular rate and rhythm, no appreciable murmur.  Lymph/Hematologic: No lower extremity edema  Genitourinary: Not examined  Skin: No rashes  Musculoskeletal: Muscular tone intact in all extremities.  Neurologic: Alert, conversant, appropriate in conversation.  Mental status gross intact  Psychiatric: Normal affect, very pleasant    Data   Data reviewed today: I reviewed all medications, new labs and imaging results over the last 24 hours. I personally reviewed the EKG tracing showing Normal sinus rhythm with heart rate in the 70s, chronic T wave inversion in lead III, flattened/inverted T wave in V1 and the chest x-ray image(s) showing Clear lungs.    Recent Labs   Lab 01/10/20  0509 01/10/20  0153   WBC 10.0 9.8   HGB 13.9 15.0   MCV 90 89    270   NA  --  139   POTASSIUM  --  3.3*   CHLORIDE  --  105   CO2  --  29   BUN  --  14   CR  --  0.88    ANIONGAP  --  5   NANDINI  --  8.9   GLC  --  98   ALBUMIN  --  4.1   PROTTOTAL  --  8.1   BILITOTAL  --  0.3   ALKPHOS  --  86   ALT  --  47   AST  --  36   LIPASE  --  140   TROPI 3.085* 2.113*

## 2020-01-10 NOTE — PRE-PROCEDURE
GENERAL PRE-PROCEDURE:   Procedure:  Coronary angiogram, left heart catheterization, left ventriculogram and possible intervention.  Date/Time:  1/10/2020 9:17 AM    Verbal consent obtained?: Yes    Written consent obtained?: Yes    Risks and benefits: Risks, benefits and alternatives were discussed    Consent given by:  Patient  Patient states understanding of procedure being performed: Yes    Patient's understanding of procedure matches consent: Yes    Procedure consent matches procedure scheduled: Yes    Expected level of sedation:  Moderate  Appropriately NPO:  Yes  ASA Class:  Class 3- Severe systemic disease, definite functional limitations  Mallampati  :  Grade 1- soft palate, uvula, tonsillar pillars, and posterior pharyngeal wall visible  Lungs:  Lungs clear with good breath sounds bilaterally  Heart:  Normal heart sounds and rate  History & Physical reviewed:  History and physical reviewed and no updates needed  Statement of review:  I have reviewed the lab findings, diagnostic data, medications, and the plan for sedation

## 2020-01-10 NOTE — ED PROVIDER NOTES
"  History     Chief Complaint:  Chest Tightness    HPI   Stepan Pablo is a 49 year old male with a history of hypertension who presents with chest tightness. The patient states that around 1830 he was skating, which was a higher level of exertion than he has reached in multiple months, when he developed chest tightness and an ache in his right arm \"like [he] had just lifted something heavy.\" He stopped skating and went home but still had persistent tightness, though he states the tightness has steadily improved with time and is now absent. He endorses taking 325 mg aspirin around 1900. The patient denies any recent fever, cough, cold, diaphoresis, shortness of breath, or nausea.    Cardiac/PE/DVT Risk Factors:  History of hypertension - positive    History of hyperlipidemia - negative  History of diabetes - negative   History of smoking - negative   Family history of heart complications - positive: paternal grandfather with myocardial infarction at age 50  Recent travel - negative     Allergies:  Chlorpheniramine-Phenylpropan     Medications:    Amlodipine besylate  Atorvastatin calcium   Flonase    Past Medical History:    Hypertension   Hyperlipidemia, mixed   Hypercholesteremia    Past Surgical History:    Abscess in jaw and teeth pulled  Herniorrhaphy umbilical   Tympanostomy  Tooth abscess    Family History:    Sister: Thyroid disease     Social History:  The patient was unaccompanied to the ED.  Smoking Status: Never Smoker  Smokeless Tobacco: Never Used  Alcohol Use: Positive  Drug Use: Negative  Marital Status:       Review of Systems   Constitutional: Negative for diaphoresis and fever.   HENT: Negative for congestion and rhinorrhea.    Respiratory: Positive for chest tightness. Negative for cough and shortness of breath.    Gastrointestinal: Negative for nausea.   Musculoskeletal:        Aching right arm    All other systems reviewed and are negative.      Physical Exam     Patient Vitals for the " "past 24 hrs:   BP Temp Temp src Pulse Heart Rate Resp SpO2 Height Weight   01/10/20 0212 110/63 -- -- (!) 48 50 20 -- -- --   01/10/20 0207 (!) 159/96 -- -- 82 81 10 -- -- --   01/10/20 0139 (!) 188/115 97.5  F (36.4  C) Temporal 86 -- 18 96 % 1.727 m (5' 8\") 93 kg (205 lb)     Physical Exam  General: Patient is alert and normal appearing.  HEENT: Head atraumatic    Eyes: pupils equal and reactive. Conjunctiva clear   Nares: patent   Oropharynx: no lesions, uvula midline, no palatal draping, normal voice, no trismus  Neck: Supple without lymphadenopathy, no meningismus  Chest: Heart regular rate and rhythm.   Lungs: Equal clear to auscultation with no wheeze or rales  Abdomen: Soft, non tender, nondistended, normal bowel sounds  Back: No costovertebral angle tenderness, no midline C, T or L spine tenderness  Neuro: Grossly nonfocal, normal speech, strength equal bilaterally, CN 2-12 intact  Extremities: No deformities, equal radial and DP pulses. No clubbing, cyanosis.  No edema  Skin: Warm and dry with no rash.     Emergency Department Course     ECG:  ECG taken at 0140, ECG read at 0155  Normal sinus rhythm  Normal ECG  Rate 83 bpm. MA interval 160 ms. QRS duration 90 ms. QT/QTc 362/425 ms. P-R-T axes 53 49 19.    ECG:  ECG taken at 0237, ECG read at 0240  Sinus bradycardia  Otherwise renae ECG  Rate 58 bpm. MA interval 160 ms. QRS duration 90 ms. QT/QTc 462/453 ms. P-R-T axes 30 47 6.    Imaging:  Radiology findings were communicated with the patient who voiced understanding of the findings.    XR Chest 2 Views  No evidence of active cardiopulmonary disease.   Reading per radiology    Laboratory:  Laboratory findings were communicated with the patient who voiced understanding of the findings.       CBC: WBC 9.8, HGB 15.0,   CMP: Potassium 3.3 (L), o/w WNL (Creatinine 0.88)  Troponin (Collected 0153): 2.113 (HH)  Lipase: 140    Interventions:  0203 Nitrostat 0.4 mg Sublingual   0208 Nitrostat 0.4 mg " Sublingual  0213 NS 1000 ml IV  0234 Klor-Con 20 mEq Oral  0301 Heparin Loading Dose 4500 Units IV  0306 Heparin 950 units/hr IV    Emergency Department Course:    0140 EKG obtained as noted above.    0153 Nursing notes and vitals reviewed. IV was inserted and blood was drawn for laboratory testing, results above.    0155 I performed an exam of the patient as documented above.     0216 The patient was sent for a chest xray while in the emergency department, results above.     0237 EKG obtained as noted above.    0245 I spoke with Dr. Lorenzana of the hospitalist service from [facility] regarding patient's presentation, findings, and plan of care.    Findings and plan explained to the patient who consents to admission. Discussed the patient with Dr. Lorenzana, who will admit the patient to a [facility] bed for further monitoring, evaluation, and treatment.    Impression & Plan     Medical Decision Making:  Stepan Pablo is a 49 year old male who presents with chest pain.  His history and risk factor analysis are significant for retention, high cholesterol, family history.  The workup in the Emergency Department (see above for cardiac enzymes and EKG)  is showing evidence of an STEMI.  My clinical suspicion of acute coronary syndrome is high enough to warrant further therapy and investigation.  I will admit the patient  to medicine service for further workup.  The patient is  pain free after nitroglycerin and aspirin taken at home.  After discussing with him the risks and benefits of heparinization and given lack of contraindication to this therapy, heparin bolus and drip were started given suspicion of acute coronary syndrome.      There is no clinical, laboratory, or radiographic evidence of pulmonary embolism, AAA, aortic dissection, pneumonia or pneumothorax.     He agrees to be admitted and all questions were answered.    Critical Care Time: was 25 minutes for this patient excluding procedures    Diagnosis:    ICD-10-CM     1. Unstable angina (H) I20.0    2. Chest pain due to myocardial ischemia, unspecified ischemic chest pain type I25.9    3. NSTEMI (non-ST elevated myocardial infarction) (H) I21.4      Disposition:  The patient is admitted into the care of Dr. Lorenzana.    Scribe Disclosure:  I, Jim Brown, am serving as a scribe at 2:08 AM on 1/10/2020 to document services personally performed by Shonda Diaz MD based on my observations and the provider's statements to me.     Scribe Disclosure:  IRafaela, am serving as a scribe at 4:03 AM on 1/10/2020 to document services personally performed by Shonda Diaz M based on my observations and the provider's statements to me.       EMERGENCY DEPARTMENT       Shonda Diaz MD  01/10/20 0449

## 2020-01-10 NOTE — ED NOTES
DATE:  1/10/2020   TIME OF RECEIPT FROM LAB:  0230  LAB TEST:  2.113  LAB VALUE:  Troponin  RESULTS GIVEN WITH READ-BACK TO [place]  TIME LAB VALUE REPORTED TO PROVIDER:  0230

## 2020-01-10 NOTE — ED NOTES
Patient heart rate 50 states he does not feel well, pale skin. Chest discomfort resolved.  Placed supine in bed. MD notified.

## 2020-01-10 NOTE — PLAN OF CARE
VS. Pt denies pain. He is A&Ox4. He is up with SBA for IV pole management. SR. Heparin is @ 950. Cardiology consult. Continue to monitor.

## 2020-01-10 NOTE — ED NOTES
"[facility]  ED Nurse Handoff Report    ED Chief complaint: Chest Pain      ED Diagnosis:   Final diagnoses:   Unstable angina (H)   Chest pain due to myocardial ischemia, unspecified ischemic chest pain type       Code Status: Not on file.     Allergies: No Known Allergies    Patient Story: Presents to be evaluated for exertional chest and arm discomfort.   Focused Assessment:  Discomfort resolved after second dose of nitroglycerin.  Had episode of feeling faint after second nitroglycerin which has resolved.      Treatments and/or interventions provided: Nitroglycerin and IV fludis  Patient's response to treatments and/or interventions: Symptoms resolved.     To be done/followed up on inpatient unit:  Unknown    Does this patient have any cognitive concerns?: None.     Activity level - Baseline/Home:  Independent  Activity Level - Current:   Independent    Patient's Preferred language: English   Needed?: No    Isolation: None  Infection: Not Applicable  Bariatric?: No    Vital Signs:   Vitals:    01/10/20 0139 01/10/20 0207 01/10/20 0212 01/10/20 0226   BP: (!) 188/115 (!) 159/96 110/63 (!) 148/91   Pulse: 86 82 (!) 48 61   Resp: 18 10 20 14   Temp: 97.5  F (36.4  C)      TempSrc: Temporal      SpO2: 96%      Weight: 93 kg (205 lb)      Height: 1.727 m (5' 8\")          Cardiac Rhythm:     Was the PSS-3 completed:   Yes  What interventions are required if any?               Family Comments: None    For the majority of the shift this patient's behavior was Green.   Behavioral interventions performed were NA.    ED NURSE PHONE NUMBER: [phone]         "

## 2020-01-11 NOTE — DISCHARGE SUMMARY
[facility]  Hospitalist Discharge Summary       Date of Admission:  1/10/2020  Date of Discharge:  1/10/2020  7:00 PM  Discharging Provider: Hernan Collazo MD      Discharge Diagnoses     NSTEMI    Essential HTN    Hyperlipidemia    Follow-ups Needed After Discharge   Follow-up Appointments     Follow-up and recommended labs and tests       Follow up with primary care provider within 7-10 days to evaluate   medication change and for hospital follow- up.  The following labs/tests   are recommended: BMP in 1 week.               Unresulted Labs Ordered in the Past 30 Days of this Admission     No orders found from 12/11/2019 to 1/11/2020.      These results will be followed up by none    Discharge Disposition   Discharged to home  Condition at discharge: Stable    Hospital Course   Stepan Pablo is a 49 year old male admitted on 1/10/2020. He presented to the ER for evaluation of persistent chest tightness and left arm aching after strenuous physical activity.  Found to have an elevated troponin and relief of chest discomfort with nitroglycerin.     Non-ST elevation myocardial infarction  Essential HTN  Hyperlipidemia  Presented with persistent chest tightness and left arm aching after strenuous physical activity. Troponin was elevated. Started on ASA, BB, statin and heparin drip. Cardiology consulted. Angiogram was done. Showed small vessel disease and so no stent was needed, and recommended medical management.     Discharged on ASA, Plavix, Statin, BB and ACEI. PTA amlodipine was discontinued since BB and ACEI started. LVEF was normal on ECHO.     All of his cardiac medications needs refill on follow up visit.     Consultations This Hospital Stay   PHARMACY TO DOSE HEPARIN  PHARMACY TO DOSE HEPARIN  CARDIAC REHAB IP CONSULT  CARDIOLOGY IP CONSULT  PHARMACY TO DOSE HEPARIN  PHARMACY IP CONSULT  PHARMACY IP CONSULT  SMOKING CESSATION PROGRAM IP CONSULT  SMOKING CESSATION PROGRAM IP CONSULT    Code  Status   Full Code    Time Spent on this Encounter   I, Hernan Collazo MD, personally saw the patient today and spent greater than 30 minutes discharging this patient.       Hernan Collazo MD  [facility]  ______________________________________________________________________    Physical Exam   Vital Signs: Temp: 98.7  F (37.1  C) Temp src: Oral BP: 124/77 Pulse: 75 Heart Rate: 93 Resp: 19 SpO2: 97 % O2 Device: None (Room air) Oxygen Delivery: 3 LPM  Weight: 205 lbs 0 oz    General: AAOx3, appears comfortable.  HEENT: PERRLA EOMI. Mucosa moist.   Lungs: Bilateral equal air entry. Clear to auscultation, normal work of breathing.   CVS: S1S2 regular, no tachycardia or murmur.   Abdomen: Soft, NT, ND. BS heard.  MSK: No edema or deformities.  Neuro: AAOX3. CN 2-12 normal. Strength symmetrical.  Skin: No rash.        Primary Care Physician   Physician No Ref-Primary    Discharge Orders      Basic metabolic panel     Discharge Order: F/U with Cardiac  [facility]      CARDIAC REHAB REFERRAL      Discharge Order: F/U with Cardiac  [facility]      Follow-Up with Cardiologist      Reason for your hospital stay    NSTEMI     Follow-up and recommended labs and tests     Follow up with primary care provider within 7-10 days to evaluate medication change and for hospital follow- up.  The following labs/tests are recommended: BMP in 1 week.     Activity    Your activity upon discharge: activity as tolerated     Full Code     Diet    Follow this diet upon discharge: Orders Placed This Encounter      Advance Diet as Tolerated: Regular Diet Adult       Significant Results and Procedures   Most Recent 3 CBC's:  Recent Labs   Lab Test 01/10/20  0509 01/10/20  0153   WBC 10.0 9.8   HGB 13.9 15.0   MCV 90 89    270     Most Recent 3 BMP's:  Recent Labs   Lab Test 01/10/20  0153 10/12/16  0954     --    POTASSIUM 3.3*  --    CHLORIDE 105  --    CO2 29  --    BUN 14  --    CR 0.88 0.97   ANIONGAP 5  --    NANDINI 8.9  --     GLC 98  --      Most Recent 2 LFT's:  Recent Labs   Lab Test 01/10/20  0153   AST 36   ALT 47   ALKPHOS 86   BILITOTAL 0.3     Most Recent 3 INR's:No lab results found.  Most Recent 3 Troponin's:  Recent Labs   Lab Test 01/10/20  1110 01/10/20  0509 01/10/20  0153   TROPI 2.750* 3.085* 2.113*     Most Recent TSH and T4:  Recent Labs   Lab Test 01/10/20  0509   TSH 1.61   ,   Results for orders placed or performed during the hospital encounter of 01/10/20   XR Chest 2 Views    Narrative    EXAM: CHEST 2 VIEWS  LOCATION: [facility]  DATE/TIME: 1/10/2020 2:16 AM    INDICATION: Chest pain.    COMPARISON: None.    FINDINGS: The lungs are clear. Normal-sized cardiac silhouette.      Impression    IMPRESSION: No evidence of active cardiopulmonary disease.    US Carotid Bilateral    Narrative    BILATERAL CAROTID ULTRASOUND   1/10/2020 4:35 PM     HISTORY: Carotid bruits.    COMPARISON: None.    RIGHT CAROTID FINDINGS:  Minimal plaque at the carotid bulb and  internal carotid artery  Right ICA PSV:  79  cm/sec.  Right ICA EDV:  36 cm/sec.  Right ICA/CCA PSV Ratio:  1.12    These indicate less than 50% diameter stenosis of the right ICA.    Right Vertebral: Antegrade flow.   Right ECA: Antegrade flow.     LEFT CAROTID FINDINGS:  Minimal plaque at the carotid bulb and  internal carotid artery.  Left ICA PSV:  68  cm/sec.  Left ICA EDV:  30 cm/sec.  Left ICA/CCA PSV Ratio:  1.2    These indicate less than 50% diameter stenosis of the left ICA.    Left Vertebral: Antegrade flow.   Left ECA: Antegrade flow.     Causes of Decreased Accuracy:   None.       Impression    IMPRESSION:    1. Less than 50% diameter stenosis of the right ICA relative to the  distal ICA diameter.   2. Less than 50% diameter stenosis of the left ICA relative to the  distal ICA diameter.     MISAEL THOMPSON,    Echocardiogram Complete    Narrative    [id number]  [id number]  [id number]  [id number]^CLAUDY^KRIS^KAREN           [facility]  [facility]  Echocardiography Laboratory  [address] [place] [address]        Name: GENIE PEREZ  MRN: [id number]  : 1970  Study Date: 01/10/2020 01:50 PM  Age: 49 yrs  Gender: Male  Patient Location: [facility]  Reason For Study: Chest Pain, Chest Tightness, Chest Pressure  Ordering Physician: KRIS LOCO  Performed By: rBendon Rich RDCS     BSA: 2.1 m2  Height: 68 in  Weight: 205 lb  HR: 69  BP: 144/114 mmHg  _____________________________________________________________________________  __        Procedure  Complete Portable Echo Adult. Optison (NDC #[id number]) given intravenously.  _____________________________________________________________________________  __        Interpretation Summary     Left ventricular size, global systolic function, estimated LVEF 55-60%.  Mild mid to apical inferolateral hypokinesis.  Right ventricular global function is normal.  No significant valvular abnormalities.     Incidentally the liver architecture appears to be somewhat heterogeneous on  the subcostal views, consider dedicated RUQ ultrasound if clinically  appropriate.     There are no prior studies available for comparison.  _____________________________________________________________________________  __        Left Ventricle  The left ventricle is normal in size. There is concentric remodeling present.  Left ventricular systolic function is normal. The visual ejection fraction is  estimated at 55-60%. Left ventricular diastolic function is normal. Mild mid  to apical inferolateral hypokinesis.     Right Ventricle  The right ventricle is normal in structure, function and size.     Atria  Normal left atrial size. Right atrial size is normal.     Mitral Valve  The mitral valve is normal in structure and function.        Tricuspid Valve  The tricuspid valve is not well visualized, but is grossly normal. There is  physiologic tricuspid regurgitation. The right ventricular systolic pressure  is  approximated at 16mmHg plus the right atrial pressure.     Aortic Valve  The aortic valve is trileaflet. There is trivial trileaflet aortic sclerosis.  There is trace aortic regurgitation.     Pulmonic Valve  The pulmonic valve is not well seen, but is grossly normal.     Vessels  The aortic root is normal size. Normal size ascending aorta. The inferior vena  cava was normal in size with preserved respiratory variability.     Pericardium  There is no pericardial effusion.     _____________________________________________________________________________  __  MMode/2D Measurements & Calculations  RVDd: 3.6 cm  IVSd: 1.1 cm  LVIDd: 4.6 cm  LVIDs: 3.1 cm  LVPWd: 1.2 cm  FS: 31.0 %     LV mass(C)d: 187.3 grams  LV mass(C)dI: 90.7 grams/m2  Ao root diam: 3.6 cm  LA dimension: 3.8 cm  asc Aorta Diam: 3.6 cm  LA/Ao: 1.1  LA Volume (BP): 54.0 ml  LA Volume Index (BP): 26.1 ml/m2  RWT: 0.53        Doppler Measurements & Calculations  MV E max mynor: 71.3 cm/sec  MV A max mynor: 68.2 cm/sec  MV E/A: 1.0     MV dec time: 0.20 sec  PA acc time: 0.12 sec  TR max mynor: 200.5 cm/sec  TR max P.1 mmHg  E/E': 10.2  E/E' avg: 10.4  Lateral E/e': 10.2  Medial E/e': 10.7  Peak E' Mynor: 7.0 cm/sec           _____________________________________________________________________________  __           Report approved by: Jignesh Higgins 01/10/2020 03:16 PM      Cardiac Catheterization     Value    Cath EF Estimated 60    Narrative      Left ventricular filling pressures are normal .    The ejection fraction is greater than 55% by visual estimate.    3rd Mrg lesion is 50% stenosed.    Mid Cx lesion is 50% stenosed.    Dist LAD lesion is 50% stenosed.    Pressure wire/FFR was performed on the lesion. FFR: 0.94.    Pressure wire/FFR was performed on the lesion. FFR: 0.97.     1.  Culprit appears to be a small lateral branch of the large obtuse   marginal.  Vessel is small and initially best treated medically.  2.  IFR measured into the distal left  anterior descending artery and main   branch of the obtuse marginal are both not significant.  3.  Left ventricular end-diastolic pressure 14 mmHg.  No mitral vegetation   or aortic stenosis.  Normal left ventricular function without focal wall   motion abnormality in an GARCIA projection.           Discharge Medications   Discharge Medication List as of 1/10/2020  6:28 PM      START taking these medications    Details   aspirin (ASA) 81 MG EC tablet Take 1 tablet (81 mg) by mouth daily, Disp-30 tablet, R-0, E-Prescribe      clopidogrel (PLAVIX) 75 MG tablet Take 1 tablet (75 mg) by mouth daily, Disp-30 tablet, R-0, E-Prescribe      lisinopril (PRINIVIL/ZESTRIL) 10 MG tablet Take 1 tablet (10 mg) by mouth daily, Disp-30 tablet, R-0, E-Prescribe      metoprolol succinate ER (TOPROL-XL) 50 MG 24 hr tablet Take 1 tablet (50 mg) by mouth daily, Disp-30 tablet, R-0, E-Prescribe      nitroGLYcerin (NITROSTAT) 0.4 MG sublingual tablet For chest pain place 1 tablet under the tongue every 5 minutes for 3 doses. If symptoms persist 5 minutes after 1st dose call 911., Disp-20 tablet, R-0, E-Prescribe      rosuvastatin (CRESTOR) 40 MG tablet Take 1 tablet (40 mg) by mouth daily, Disp-30 tablet, R-0, E-Prescribe         CONTINUE these medications which have NOT CHANGED    Details   Fexofenadine HCl (ALLEGRA PO) Take 180 mg by mouth At Bedtime , Historical      FLONASE INHA 50 MCG/DOSE NA Spray 1 spray in nostril At Bedtime , Historical      ketotifen (ZADITOR/REFRESH ANTI-ITCH) 0.025 % ophthalmic solution Place 1 drop into both eyes At Bedtime, Historical         STOP taking these medications       AMLODIPINE BESYLATE PO Comments:   Reason for Stopping:             Allergies   No Known Allergies

## 2020-01-11 NOTE — PROGRESS NOTES
Spoke with Mr. Yepezelsa: he is feeling well.  Plan is to treat marginal branch stenosis medically (small vessel) - intervene only if recurrent symptoms.  Will follow-up in clinic with [facility] visit in 1 to 2 weeks.

## 2020-01-11 NOTE — PLAN OF CARE
Pt received discharge instructions and medications, questions answered, refused wheelchair and ambulated off of unit.

## 2020-01-13 ENCOUNTER — TELEPHONE (OUTPATIENT)
Dept: CARDIOLOGY | Facility: CLINIC | Age: 50
End: 2020-01-13

## 2020-01-13 NOTE — TELEPHONE ENCOUNTER
Patient was evaluated by cardiology while inpatient for chest pain that radiated to left arm during recreational ice skating, elevated Troponin, NSTEMI. 1/10/20: Coronary angiogram showed culprit lesion that appears to be a small lateral branch of the large obtuse marginal. Vessel is small and initially best treated medically. Will not intervene unless pt becomes symptomatic. Started on Plavix, ASA, NTG PRN, Crestor, Lisinopril and Toprol. 1/10/20 Carotid US completed secondary to cathy bruits-showed less than 50% stenosis to cathy ICA. Called patient to discuss any post hospital d/c questions he may have, review medication changes, and confirm f/u appts.  Patient denied any questions regarding new medications or changes with their PTA medications. Patient denied any SOB, chest pain, or light headedness. LRA cardiac cath site is without bleeding, swelling, redness or tenderness. Denies fever. RN confirmed with patient that he still needs to schedule for f/u lab, cardiology [facility] and cardiac rehab as ordered. Phone numbers provided and phone call was transferred to scheduling. Patient advised to call clinic with any cardiac related questions or concerns prior to this [facility]'t. Patient verbalized understanding and agreed with plan. JAMAAL Bar RN.

## 2020-01-14 LAB
CATH EF ESTIMATED: 60 %
INTERPRETATION ECG - MUSE: NORMAL

## 2020-01-22 ENCOUNTER — OFFICE VISIT (OUTPATIENT)
Dept: CARDIOLOGY | Facility: CLINIC | Age: 50
End: 2020-01-22
Attending: INTERNAL MEDICINE
Payer: COMMERCIAL

## 2020-01-22 ENCOUNTER — HOSPITAL ENCOUNTER (OUTPATIENT)
Dept: CARDIAC REHAB | Facility: CLINIC | Age: 50
End: 2020-01-22
Attending: INTERNAL MEDICINE
Payer: COMMERCIAL

## 2020-01-22 VITALS
HEART RATE: 56 BPM | WEIGHT: 205.4 LBS | SYSTOLIC BLOOD PRESSURE: 143 MMHG | HEIGHT: 68 IN | BODY MASS INDEX: 31.13 KG/M2 | DIASTOLIC BLOOD PRESSURE: 83 MMHG

## 2020-01-22 DIAGNOSIS — I21.4 NSTEMI (NON-ST ELEVATED MYOCARDIAL INFARCTION) (H): ICD-10-CM

## 2020-01-22 DIAGNOSIS — I10 ESSENTIAL HYPERTENSION: ICD-10-CM

## 2020-01-22 DIAGNOSIS — E78.5 HYPERLIPIDEMIA LDL GOAL <70: ICD-10-CM

## 2020-01-22 LAB
ANION GAP SERPL CALCULATED.3IONS-SCNC: 10.7 MMOL/L (ref 6–17)
BUN SERPL-MCNC: 14 MG/DL (ref 7–30)
CALCIUM SERPL-MCNC: 9.3 MG/DL (ref 8.5–10.5)
CHLORIDE SERPL-SCNC: 106 MMOL/L (ref 98–107)
CO2 SERPL-SCNC: 29 MMOL/L (ref 23–29)
CREAT SERPL-MCNC: 1.15 MG/DL (ref 0.7–1.3)
GFR SERPL CREATININE-BSD FRML MDRD: 68 ML/MIN/{1.73_M2}
GLUCOSE SERPL-MCNC: 95 MG/DL (ref 70–105)
POTASSIUM SERPL-SCNC: 3.7 MMOL/L (ref 3.5–5.1)
SODIUM SERPL-SCNC: 142 MMOL/L (ref 136–145)

## 2020-01-22 PROCEDURE — 99214 OFFICE O/P EST MOD 30 MIN: CPT | Performed by: PHYSICIAN ASSISTANT

## 2020-01-22 PROCEDURE — 93798 PHYS/QHP OP CAR RHAB W/ECG: CPT

## 2020-01-22 PROCEDURE — 80048 BASIC METABOLIC PNL TOTAL CA: CPT | Performed by: PHYSICIAN ASSISTANT

## 2020-01-22 PROCEDURE — 36415 COLL VENOUS BLD VENIPUNCTURE: CPT | Performed by: PHYSICIAN ASSISTANT

## 2020-01-22 PROCEDURE — 40000116 ZZH STATISTIC OP CR VISIT

## 2020-01-22 RX ORDER — ROSUVASTATIN CALCIUM 40 MG/1
40 TABLET, COATED ORAL DAILY
Qty: 30 TABLET | Refills: 5 | Status: SHIPPED | OUTPATIENT
Start: 2020-01-22 | End: 2020-03-18

## 2020-01-22 RX ORDER — METOPROLOL SUCCINATE 50 MG/1
50 TABLET, EXTENDED RELEASE ORAL DAILY
Qty: 30 TABLET | Refills: 5 | Status: SHIPPED | OUTPATIENT
Start: 2020-01-22 | End: 2020-03-18

## 2020-01-22 RX ORDER — CLOPIDOGREL BISULFATE 75 MG/1
75 TABLET ORAL DAILY
Qty: 30 TABLET | Refills: 5 | Status: SHIPPED | OUTPATIENT
Start: 2020-01-22 | End: 2020-03-18

## 2020-01-22 RX ORDER — LISINOPRIL 10 MG/1
10 TABLET ORAL DAILY
Qty: 30 TABLET | Refills: 5 | Status: SHIPPED | OUTPATIENT
Start: 2020-01-22 | End: 2020-03-18

## 2020-01-22 ASSESSMENT — MIFFLIN-ST. JEOR: SCORE: 1776.19

## 2020-01-22 NOTE — PROGRESS NOTES
Cardiology Clinic Progress Note    Stepan Pablo MRN# [id number]   YOB: 1970 Age: 49 year old   Primary cardiologist: Dr. Helton         Assessment and Plan:     In summary, Stepan Pablo presents today for a hospital f/u visit.     1. CAD / NSTEMI, with small-vessel OM disease managing medically - denies angina, tolerating current medications.   2. Hypertension - well controlled at cardiac rehab.  3. Hyperlipidemia - recently started on statin.    Plan:  - No changes today. Will continue current medications and continue to monitor through cardiac rehab.   - Advised mediterranean-style diet and routine cardiovascular exercise regimen for heart health and weight loss.  - Majority of visit spent on education and counseling.     Follow-up:  - Me in 2 months with repeat lipid panel.   - Dr. Helton in 3 months.         History of Presenting Illness:      Stepan Pablo is a pleasant 49 year old patient who presents today for a hospital f/u visit.    The patient has a history of the following -   # CAD  # HTN  # HLP  # Obesity - Body mass index is 31.23 kg/m .   # Never-smoker  # FH - paternal grandfather with CAD, otherwise non-contributory    Stepan had no prior history of CAD before presenting to [facility] on 1/10/20 with symptoms suggestive of typical angina, slight ST abnormalities on EKG and troponin rise up to 3. TTE showed a preserved LVEF with mild apical/inferolateral hypokinesis. He underwent coronary angiogram which showed the culprit lesion to be a small lateral branch of a large obtuse marginal. There was otherwise 50% stenosis of the LCX and distal LAD. Medical management was recommended, and DAPT, Crestor, BB and ACEi were initiated. His PTA amlodipine was stopped. Of note, his lipid panel showed a significantly elevated LDL of 180.     Today, he presents to clinic stating he's feeling quite well and not limited in any way. Patient denies chest pain, shortness of breath, PND, orthopnea, edema,  "claudication, palpitations, near syncope or syncope. He thinks the Toprol may be making him slightly groggy but this is a very mild symptom. BP in clinic is mildly elevated but was well-controlled at rehab earlier today.          Review of Systems:     12-pt ROS is negative except for as noted in the HPI.          Physical Exam:     Vitals: BP (!) 143/83   Pulse 56   Ht 1.727 m (5' 8\")   Wt 93.2 kg (205 lb 6.4 oz)   BMI 31.23 kg/m    Wt Readings from Last 10 Encounters:   01/22/20 93.2 kg (205 lb 6.4 oz)   01/10/20 93 kg (205 lb)   10/12/16 95.3 kg (210 lb)   09/19/16 90.7 kg (200 lb)   09/26/10 85.5 kg (188 lb 8 oz)   10/17/06 90.3 kg (199 lb)       Constitutional:  Patient is pleasant, alert, cooperative, and in NAD.  HEENT:  NCAT. PERRLA. EOM's intact.   Neck:  CVP appears normal. No carotid bruits.   Pulmonary: Normal respiratory effort. CTAB.   Cardiac: RRR, normal S1/S2, no S3/S4, no murmur or rub.   Abdomen:  Non-tender abdomen, no hepatosplenomegaly appreciated.   Vascular: Pulses in the upper and lower extremities are 2+ and equal bilaterally. L radial access site C/D/I, no ecchymosis, erythema, bruit appreciated  Extremities: No edema, erythema, cyanosis or tenderness appreciated.  Skin:  No rashes or lesions appreciated.   Neurological:  No gross motor or sensory deficits.   Psych: Appropriate affect.        Data:   Labs reviewed:  Recent Labs   Lab Test 01/10/20  0509   *   HDL 44   NHDL 203*   CHOL 247*   TRIG 116   TSH 1.61       Lab Results   Component Value Date    WBC 10.0 01/10/2020    RBC 4.65 01/10/2020    HGB 13.9 01/10/2020    HCT 41.6 01/10/2020    MCV 90 01/10/2020    MCH 29.9 01/10/2020    MCHC 33.4 01/10/2020    RDW 13.2 01/10/2020     01/10/2020       Lab Results   Component Value Date     01/22/2020    POTASSIUM 3.7 01/22/2020    CHLORIDE 106 01/22/2020    CO2 29 01/22/2020    ANIONGAP 10.7 01/22/2020    GLC 95 01/22/2020    BUN 14 01/22/2020    CR 1.15 01/22/2020 "    GFRESTIMATED 68 01/22/2020    GFRESTBLACK 82 01/22/2020    NANDINI 9.3 01/22/2020      Lab Results   Component Value Date    AST 36 01/10/2020    ALT 47 01/10/2020       Lab Results   Component Value Date    A1C 5.9 (H) 01/10/2020       No results found for: INR        Problem List:     Patient Active Problem List   Diagnosis     NSTEMI (non-ST elevated myocardial infarction) (H)           Medications:     Current Outpatient Medications   Medication Sig Dispense Refill     aspirin (ASA) 81 MG EC tablet Take 1 tablet (81 mg) by mouth daily 30 tablet 0     clopidogrel (PLAVIX) 75 MG tablet Take 1 tablet (75 mg) by mouth daily 30 tablet 0     Fexofenadine HCl (ALLEGRA PO) Take 180 mg by mouth At Bedtime        FLONASE INHA 50 MCG/DOSE NA Spray 1 spray in nostril At Bedtime        ketotifen (ZADITOR/REFRESH ANTI-ITCH) 0.025 % ophthalmic solution Place 1 drop into both eyes At Bedtime       lisinopril (PRINIVIL/ZESTRIL) 10 MG tablet Take 1 tablet (10 mg) by mouth daily 30 tablet 0     metoprolol succinate ER (TOPROL-XL) 50 MG 24 hr tablet Take 1 tablet (50 mg) by mouth daily 30 tablet 0     nitroGLYcerin (NITROSTAT) 0.4 MG sublingual tablet For chest pain place 1 tablet under the tongue every 5 minutes for 3 doses. If symptoms persist 5 minutes after 1st dose call 911. 20 tablet 0     rosuvastatin (CRESTOR) 40 MG tablet Take 1 tablet (40 mg) by mouth daily 30 tablet 0           Past Medical History:     Past Medical History:   Diagnosis Date     Hyperlipidemia      Hypertension      Past Surgical History:   Procedure Laterality Date     CV CORONARY ANGIOGRAM N/A 1/10/2020    Procedure: Coronary Angiogram;  Surgeon: Nico Woodard MD;  Location:  HEART CARDI[facility] CATH LAB     CV INSTANTANEOUS WAVE-FREE RATIO N/A 1/10/2020    Procedure: Instantaneous Wave-Free Ratio;  Surgeon: Nico Woodard MD;  Location:  HEART [facility]ARD[facility] CATH LAB     CV LEFT HEART CATH N/A 1/10/2020    Procedure: Left Heart Cath;  Surgeon:  Nico Woodard MD;  Location:  HE[facility] CARD[facility] CATH LAB     CV LEFT VENTRICULOGRAM N/A 1/10/2020    Procedure: Left Ventriculogram;  Surgeon: Nico Woodard MD;  Location:  HEART CARDIAC CATH LAB     ENT SURGERY      abcess in jaw and teeth pulled     HERNIORRHAPHY UMBILICAL N/A 10/12/2016    Procedure: HERNIORRHAPHY UMBILICAL;  Surgeon: Parker Alaniz MD;  Location: [facility] OR     Family History   Problem Relation Age of Onset     Thyroid Disease Sister      Heart Disease Maternal Grandmother      Social History     Socioeconomic History     Marital status:      Spouse name: Not on file     Number of children: Not on file     Years of education: Not on file     Highest education level: Not on file   Occupational History     Not on file   Social Needs     Financial resource strain: Not on file     Food insecurity:     Worry: Not on file     Inability: Not on file     Transportation needs:     Medical: Not on file     Non-medical: Not on file   Tobacco Use     Smoking status: Never Smoker     Smokeless tobacco: Never Used   Substance and Sexual Activity     Alcohol use: Yes     Comment: occ     Drug use: No     Sexual activity: Not on file   Lifestyle     Physical activity:     Days per week: Not on file     Minutes per session: Not on file     Stress: Not on file   Relationships     Social connections:     Talks on phone: Not on file     Gets together: Not on file     Attends [employer] service: Not on file     Active member of club or organization: Not on file     Attends meetings of clubs or organizations: Not on file     Relationship status: Not on file     Intimate partner violence:     Fear of current or ex partner: Not on file     Emotionally abused: Not on file     Physically abused: Not on file     Forced sexual activity: Not on file   Other Topics Concern     Parent/sibling w/ CABG, MI or angioplasty before 65F 55M? Not Asked   Social History Narrative     Not on file            Allergies:   Patient has no known allergies.      Yasemin Pinzon PA-C  [facility]  Pager: [phone]

## 2020-01-22 NOTE — PATIENT INSTRUCTIONS
Today's Plan:   - Cardiac rehab will help to get you back to your functional baseline, and also help us to monitor for recurrent symptoms. Please do at least a few sessions, then may stop if you feel you're not getting a lot of benefit from it.  - Please call me with recurrent arm pain.   - A mediterranean-style diet and eventual routine exercise regimen is recommended for heart health and weight loss. Limit yourself for the next week or so, then you may advance your activity as tolerated (except for snow shoveling - avoid).   - Please return to see me in 2 months with a repeat blood test for the cholesterol. Dr. Helton in April.     If you have questions or concerns please call my nurse team at [phone].  Scheduling phone number: [phone]  Reminder: Please bring in all current medications, over the counter supplements and vitamin bottles to your next appointment.    It was a pleasure seeing you today!     Yasemin Pinzon PA-C

## 2020-02-16 ENCOUNTER — HEALTH MAINTENANCE LETTER (OUTPATIENT)
Age: 50
End: 2020-02-16

## 2020-03-17 ENCOUNTER — CARE COORDINATION (OUTPATIENT)
Dept: CARDIOLOGY | Facility: CLINIC | Age: 50
End: 2020-03-17

## 2020-03-17 DIAGNOSIS — I21.4 NSTEMI (NON-ST ELEVATED MYOCARDIAL INFARCTION) (H): ICD-10-CM

## 2020-03-17 DIAGNOSIS — I10 ESSENTIAL HYPERTENSION: ICD-10-CM

## 2020-03-17 DIAGNOSIS — E78.5 HYPERLIPIDEMIA LDL GOAL <70: ICD-10-CM

## 2020-03-17 NOTE — PROGRESS NOTES
Called pt, no answer, LVM requesting he return call to complete Wellness Screen for COVID-19 today for lab appt that is scheduled on 3/18/20 at 7:50am.    MICHELLE Rich 3:49 PM 3/17/2020

## 2020-03-18 RX ORDER — ROSUVASTATIN CALCIUM 40 MG/1
40 TABLET, COATED ORAL DAILY
Qty: 90 TABLET | Refills: 3 | Status: SHIPPED | OUTPATIENT
Start: 2020-03-18 | End: 2021-04-05

## 2020-03-18 RX ORDER — METOPROLOL SUCCINATE 50 MG/1
50 TABLET, EXTENDED RELEASE ORAL DAILY
Qty: 90 TABLET | Refills: 3 | Status: SHIPPED | OUTPATIENT
Start: 2020-03-18 | End: 2020-04-22

## 2020-03-18 RX ORDER — CLOPIDOGREL BISULFATE 75 MG/1
75 TABLET ORAL DAILY
Qty: 90 TABLET | Refills: 3 | Status: SHIPPED | OUTPATIENT
Start: 2020-03-18 | End: 2021-04-13

## 2020-03-18 RX ORDER — LISINOPRIL 10 MG/1
10 TABLET ORAL DAILY
Qty: 90 TABLET | Refills: 3 | Status: SHIPPED | OUTPATIENT
Start: 2020-03-18 | End: 2021-04-05

## 2020-03-18 NOTE — PROGRESS NOTES
Received VM from pt left at 6:30pm yesterday.  He reports he is feeling well and planned to get lab drawn in the AM.      Per chart review, pt did not come for fasting lab this AM and CASA Lin recommends that he push out the lab and appointment as long as he is taking his medications as prescribed.      Called and spoke with pt.  He reports he accidentally went to cardiac rehab instead of [facility] and missed lab appt today. Reviewed with pt that CASA Lin recommends rescheduling lab and clinic appt- pt notes he has OV with Dr. Helton scheduled for 4/22/20.  Advsied pt to keep that appt at this time (reviewed that that appt may become a telephone visit as well) and rescheduled fasting lab to 4/21/20.     Pt confirms he is taking all medications (aspirin, plavix, lisinopril, Toprol, and Crestor) as prescribed and is feeling well.  He denies any new questions/concerns.     MICHELLE Rich 8:51 AM 3/18/2020

## 2020-04-10 PROBLEM — E78.5 HYPERLIPIDEMIA LDL GOAL <70: Status: ACTIVE | Noted: 2020-04-10

## 2020-04-10 PROBLEM — I10 BENIGN ESSENTIAL HYPERTENSION: Status: ACTIVE | Noted: 2020-04-10

## 2020-04-20 ENCOUNTER — TELEPHONE (OUTPATIENT)
Dept: LAB | Facility: CLINIC | Age: 50
End: 2020-04-20

## 2020-04-20 NOTE — TELEPHONE ENCOUNTER
Wellness Screening Tool    Symptom Screening:    Do you have one of the following new symptoms:      Fever or reported chills?  No    A new cough (started within the past 14 days)?  No    Shortness of breath (started within the past 14 days)?  No    Nausea, vomiting or diarrhea? No    Within the past 3 weeks, have you been exposed to someone with a known positive illness below?      COVID - 19 (known or suspected)  no    Chicken pox?   no    Measles?  no    Pertussis? No        Patient notified of visitor restriction:Yes     Patient's appointment status: Patient will be seen in clinic as scheduled on 04/21/20

## 2020-04-21 DIAGNOSIS — I21.4 NSTEMI (NON-ST ELEVATED MYOCARDIAL INFARCTION) (H): ICD-10-CM

## 2020-04-21 LAB
ALT SERPL W P-5'-P-CCNC: 26 U/L (ref 5–30)
CHOLEST SERPL-MCNC: 155 MG/DL
HDLC SERPL-MCNC: 46 MG/DL
LDLC SERPL CALC-MCNC: 62 MG/DL
NONHDLC SERPL-MCNC: 109 MG/DL
TRIGL SERPL-MCNC: 233 MG/DL

## 2020-04-21 PROCEDURE — 80061 LIPID PANEL: CPT | Performed by: INTERNAL MEDICINE

## 2020-04-21 PROCEDURE — 84460 ALANINE AMINO (ALT) (SGPT): CPT | Performed by: INTERNAL MEDICINE

## 2020-04-21 PROCEDURE — 36415 COLL VENOUS BLD VENIPUNCTURE: CPT | Performed by: INTERNAL MEDICINE

## 2020-04-22 ENCOUNTER — VIRTUAL VISIT (OUTPATIENT)
Dept: CARDIOLOGY | Facility: CLINIC | Age: 50
End: 2020-04-22
Attending: INTERNAL MEDICINE
Payer: COMMERCIAL

## 2020-04-22 VITALS
HEIGHT: 68 IN | BODY MASS INDEX: 30.62 KG/M2 | DIASTOLIC BLOOD PRESSURE: 80 MMHG | HEART RATE: 71 BPM | SYSTOLIC BLOOD PRESSURE: 120 MMHG | WEIGHT: 202 LBS

## 2020-04-22 DIAGNOSIS — I10 BENIGN ESSENTIAL HYPERTENSION: ICD-10-CM

## 2020-04-22 DIAGNOSIS — I21.4 NSTEMI (NON-ST ELEVATED MYOCARDIAL INFARCTION) (H): ICD-10-CM

## 2020-04-22 DIAGNOSIS — I10 ESSENTIAL HYPERTENSION: ICD-10-CM

## 2020-04-22 DIAGNOSIS — E78.5 HYPERLIPIDEMIA LDL GOAL <70: Primary | ICD-10-CM

## 2020-04-22 PROCEDURE — 99214 OFFICE O/P EST MOD 30 MIN: CPT | Mod: 95 | Performed by: INTERNAL MEDICINE

## 2020-04-22 RX ORDER — METOPROLOL SUCCINATE 25 MG/1
25 TABLET, EXTENDED RELEASE ORAL DAILY
Qty: 90 TABLET | Refills: 3 | Status: SHIPPED | OUTPATIENT
Start: 2020-04-22 | End: 2021-04-05

## 2020-04-22 ASSESSMENT — MIFFLIN-ST. JEOR: SCORE: 1760.77

## 2020-04-22 NOTE — PROGRESS NOTES
"Stepan Pablo is a 49 year old male who is being evaluated via a billable telephone visit.      The patient has been notified of following:     \"This telephone visit will be conducted via a call between you and your physician/provider. We have found that certain health care needs can be provided without the need for a physical exam.  This service lets us provide the care you need with a short phone conversation.  If a prescription is necessary we can send it directly to your pharmacy.  If lab work is needed we can place an order for that and you can then stop by our lab to have the test done at a later time.    Telephone visits are billed at different rates depending on your insurance coverage. During this emergency period, for some insurers they may be billed the same as an in-person visit.  Please reach out to your insurance provider with any questions.    If during the course of the call the physician/provider feels a telephone visit is not appropriate, you will not be charged for this service.\"    Patient has given verbal consent for Telephone visit?  Yes    How would you like to obtain your AVS? Karla     Spoke with pt on the phone; pt reported the following:  BP: 120/80  Pulse: 71 bpm  Wt: 202 lbs  SpO2: N/A     DEJUAN Fleming    Review Of Systems  Skin: NEGATIVE  Eyes:Ears/Nose/Throat: NEGATIVE  Respiratory: NEGATIVE  Cardiovascular:NEGATIVE  Gastrointestinal: NEGATIVE  Genitourinary:NEGATIVE   Musculoskeletal: NEGATIVE  Neurologic: NEGATIVE  Psychiatric: NEGATIVE  Hematologic/Lymphatic/Immunologic: NEGATIVE  Endocrine:  NEGATIVE    Reviewed 4/22/2020, DEJUAN Fleming      Phone call duration: 14 minutes    Mr. Stepan Pablo is 49 years old and has a history of coronary artery disease.  3 months ago, he was hospitalized with chest discomfort and evidence of a non-ST elevation myocardial infarct.  Coronary angiography demonstrated only a small marginal occlusion and medical therapy was recommended.    He " was discharged on dual antiplatelet therapy (clopidogrel and low-dose aspirin) to continue for 1 year after the infarct.  He is also on metoprolol 50 mg daily of the succinate preparation as well as statin therapy (rosuvastatin) and lisinopril.  He is doing well and denies any chest, neck, arm or back discomfort.  An echocardiogram ordered by Dr. Woodard after the angiogram demonstrated normal left ventricular systolic performance with inferior lateral hypokinesis.    Mr. Pablo is working from home and that has not changed as he is a .  He did note that his children are home now during the day with cessation of school due to pandemic precautions.  He has had follow-up with TAYLOR Smith in this clinic.  The only problems he has had is easy bruisability on dual antiplatelet therapy but without overt bleeding problems.  He also noted a change in his bowel habits immediately after his hospitalization which for a time included softer stools and flatulence.  That has essentially resolved now.  His lipids indicated his LDL fraction is now 62 mg/dL and the HDL is 46 mg/dL.  Only the triglycerides remain elevated at 233 mg/dL.    Assessment/Plan:  In assessment, Mr. Olson has previously experienced a non-ST elevation myocardial infarct.  Given the small size of the marginal branch, medical therapy was recommended and at this time, his lipids are under good control and his blood pressures have previously been demonstrated to be controlled.  He will continue on dual antiplatelet platelet therapy through January 2021.  Then clopidogrel will be discontinued and low-dose aspirin will be continued.    With regard to the change in his bowel habits, we reviewed his medications.  The the initial changes have essentially resolved but after reviewing side effects of rosuvastatin and metoprolol, I suggested decreasing the metoprolol to 25 mg daily.  He likely does not need the full 50 mg.  Though he is not  currently having trouble, it could prevent future problems with the decrease to a lower dose.  I recommended follow-up at 6 months.  Inez Helton MD

## 2020-04-24 ENCOUNTER — DOCUMENTATION ONLY (OUTPATIENT)
Dept: CARDIOLOGY | Facility: CLINIC | Age: 50
End: 2020-04-24

## 2020-04-24 DIAGNOSIS — K76.9 DISORDER OF LIVER: Primary | ICD-10-CM

## 2020-04-24 NOTE — PROGRESS NOTES
"Message from Dr. Helton:  Please call patient next week and have him schedule a liver US (US abdomen-liver limited).  I re-read his echo from January and believe the liver looks normal but the reader recommends further follow up for a  \"bright\" spot - read as heterogenerous liver.    Order placed for US abd-liver      Attempted to contact patient to review Dr. Helton's recommendation. Left message for patient to call back.    "

## 2020-04-30 NOTE — PROGRESS NOTES
"Spoke to patient to review Dr Helton's recommendation to for liver US to follow up on \"bright spot\" noted on echo. Pt verbalized understanding, agreeable to plan. Phone number for scheduling provided, patient to call to arrange.   "

## 2020-05-01 ENCOUNTER — HOSPITAL ENCOUNTER (OUTPATIENT)
Dept: ULTRASOUND IMAGING | Facility: CLINIC | Age: 50
Discharge: HOME | End: 2020-05-01
Attending: INTERNAL MEDICINE | Admitting: INTERNAL MEDICINE
Payer: COMMERCIAL

## 2020-05-01 ENCOUNTER — TELEPHONE (OUTPATIENT)
Dept: CARDIOLOGY | Facility: CLINIC | Age: 50
End: 2020-05-01

## 2020-05-01 DIAGNOSIS — E78.5 HYPERLIPIDEMIA LDL GOAL <70: ICD-10-CM

## 2020-05-01 DIAGNOSIS — K76.9 DISORDER OF LIVER: ICD-10-CM

## 2020-05-01 DIAGNOSIS — I21.4 NSTEMI (NON-ST ELEVATED MYOCARDIAL INFARCTION) (H): Primary | ICD-10-CM

## 2020-05-01 DIAGNOSIS — I10 BENIGN ESSENTIAL HYPERTENSION: ICD-10-CM

## 2020-05-01 PROCEDURE — 76705 ECHO EXAM OF ABDOMEN: CPT

## 2020-05-01 NOTE — TELEPHONE ENCOUNTER
Inez Helton MD Theis, Marcie J, RN; [facility] 2 27 minutes ago (9:28 AM)       Great - completely normal.   Thank you!!   CD        Called patient to review normal liver US. OV note from 4/22/20 had mentioned f/up in 6mos. Pt verbalized understanding, agreeable to plan.     Orders entered for f/up.

## 2020-05-01 NOTE — TELEPHONE ENCOUNTER
Liver US 4-  GALLBLADDER: The gallbladder is normal. No gallstones, wall  thickening, or pericholecystic fluid. Negative sonographic Armendariz's  sign.  BILE DUCTS: There is no biliary dilatation. The common duct measures  1mm.  LIVER: Normal where seen. No hepatic mass.  RIGHT KIDNEY: No hydronephrosis.  PANCREAS: Obscured by bowel gas.  No ascites.                                                                IMPRESSION:  1.  Normal limited abdominal ultrasound.    Dr. Helton to review,

## 2020-11-22 ENCOUNTER — HEALTH MAINTENANCE LETTER (OUTPATIENT)
Age: 50
End: 2020-11-22

## 2021-04-05 DIAGNOSIS — E78.5 HYPERLIPIDEMIA LDL GOAL <70: ICD-10-CM

## 2021-04-05 DIAGNOSIS — I21.4 NSTEMI (NON-ST ELEVATED MYOCARDIAL INFARCTION) (H): ICD-10-CM

## 2021-04-05 DIAGNOSIS — I10 ESSENTIAL HYPERTENSION: ICD-10-CM

## 2021-04-05 RX ORDER — ROSUVASTATIN CALCIUM 40 MG/1
40 TABLET, COATED ORAL DAILY
Qty: 90 TABLET | Refills: 0 | Status: SHIPPED | OUTPATIENT
Start: 2021-04-05 | End: 2021-07-05

## 2021-04-05 RX ORDER — LISINOPRIL 10 MG/1
10 TABLET ORAL DAILY
Qty: 90 TABLET | Refills: 0 | Status: SHIPPED | OUTPATIENT
Start: 2021-04-05 | End: 2021-07-05

## 2021-04-05 RX ORDER — METOPROLOL SUCCINATE 25 MG/1
25 TABLET, EXTENDED RELEASE ORAL DAILY
Qty: 90 TABLET | Refills: 0 | Status: SHIPPED | OUTPATIENT
Start: 2021-04-05 | End: 2021-06-30

## 2021-04-10 ENCOUNTER — HEALTH MAINTENANCE LETTER (OUTPATIENT)
Age: 51
End: 2021-04-10

## 2021-04-13 DIAGNOSIS — I21.4 NSTEMI (NON-ST ELEVATED MYOCARDIAL INFARCTION) (H): ICD-10-CM

## 2021-04-13 RX ORDER — CLOPIDOGREL BISULFATE 75 MG/1
75 TABLET ORAL DAILY
Qty: 90 TABLET | Refills: 0 | Status: SHIPPED | OUTPATIENT
Start: 2021-04-13 | End: 2021-07-09

## 2021-06-01 DIAGNOSIS — I25.10 CORONARY ARTERY DISEASE INVOLVING NATIVE HEART WITHOUT ANGINA PECTORIS, UNSPECIFIED VESSEL OR LESION TYPE: Primary | ICD-10-CM

## 2021-06-30 DIAGNOSIS — I10 ESSENTIAL HYPERTENSION: ICD-10-CM

## 2021-06-30 DIAGNOSIS — I21.4 NSTEMI (NON-ST ELEVATED MYOCARDIAL INFARCTION) (H): ICD-10-CM

## 2021-06-30 RX ORDER — METOPROLOL SUCCINATE 25 MG/1
25 TABLET, EXTENDED RELEASE ORAL DAILY
Qty: 90 TABLET | Refills: 0 | Status: SHIPPED | OUTPATIENT
Start: 2021-06-30 | End: 2021-09-30

## 2021-07-05 DIAGNOSIS — E78.5 HYPERLIPIDEMIA LDL GOAL <70: ICD-10-CM

## 2021-07-05 DIAGNOSIS — I21.4 NSTEMI (NON-ST ELEVATED MYOCARDIAL INFARCTION) (H): ICD-10-CM

## 2021-07-05 DIAGNOSIS — I10 ESSENTIAL HYPERTENSION: ICD-10-CM

## 2021-07-05 RX ORDER — ROSUVASTATIN CALCIUM 40 MG/1
40 TABLET, COATED ORAL DAILY
Qty: 90 TABLET | Refills: 0 | Status: SHIPPED | OUTPATIENT
Start: 2021-07-05 | End: 2021-09-30

## 2021-07-05 RX ORDER — LISINOPRIL 10 MG/1
10 TABLET ORAL DAILY
Qty: 90 TABLET | Refills: 0 | Status: SHIPPED | OUTPATIENT
Start: 2021-07-05 | End: 2021-09-30

## 2021-07-09 DIAGNOSIS — I21.4 NSTEMI (NON-ST ELEVATED MYOCARDIAL INFARCTION) (H): ICD-10-CM

## 2021-07-09 RX ORDER — CLOPIDOGREL BISULFATE 75 MG/1
75 TABLET ORAL DAILY
Qty: 30 TABLET | Refills: 0 | Status: SHIPPED | OUTPATIENT
Start: 2021-07-09 | End: 2021-07-16

## 2021-07-14 ENCOUNTER — LAB (OUTPATIENT)
Dept: LAB | Facility: CLINIC | Age: 51
End: 2021-07-14
Attending: INTERNAL MEDICINE
Payer: COMMERCIAL

## 2021-07-14 DIAGNOSIS — I25.10 CORONARY ARTERY DISEASE INVOLVING NATIVE HEART WITHOUT ANGINA PECTORIS, UNSPECIFIED VESSEL OR LESION TYPE: ICD-10-CM

## 2021-07-14 LAB
ANION GAP SERPL CALCULATED.3IONS-SCNC: 2 MMOL/L (ref 3–14)
BUN SERPL-MCNC: 17 MG/DL (ref 7–30)
CALCIUM SERPL-MCNC: 9 MG/DL (ref 8.5–10.1)
CHLORIDE BLD-SCNC: 109 MMOL/L (ref 94–109)
CHOLEST SERPL-MCNC: 140 MG/DL
CO2 SERPL-SCNC: 29 MMOL/L (ref 20–32)
CREAT SERPL-MCNC: 1.03 MG/DL (ref 0.66–1.25)
FASTING STATUS PATIENT QL REPORTED: YES
GFR SERPL CREATININE-BSD FRML MDRD: 84 ML/MIN/1.73M2
GLUCOSE BLD-MCNC: 96 MG/DL (ref 70–99)
HDLC SERPL-MCNC: 45 MG/DL
LDLC SERPL CALC-MCNC: 70 MG/DL
NONHDLC SERPL-MCNC: 95 MG/DL
POTASSIUM BLD-SCNC: 3.7 MMOL/L (ref 3.4–5.3)
SODIUM SERPL-SCNC: 140 MMOL/L (ref 133–144)
TRIGL SERPL-MCNC: 126 MG/DL

## 2021-07-14 PROCEDURE — 80061 LIPID PANEL: CPT | Performed by: INTERNAL MEDICINE

## 2021-07-14 PROCEDURE — 80048 BASIC METABOLIC PNL TOTAL CA: CPT | Performed by: INTERNAL MEDICINE

## 2021-07-14 PROCEDURE — 36415 COLL VENOUS BLD VENIPUNCTURE: CPT | Performed by: INTERNAL MEDICINE

## 2021-07-16 ENCOUNTER — OFFICE VISIT (OUTPATIENT)
Dept: CARDIOLOGY | Facility: CLINIC | Age: 51
End: 2021-07-16
Attending: INTERNAL MEDICINE
Payer: COMMERCIAL

## 2021-07-16 VITALS
DIASTOLIC BLOOD PRESSURE: 69 MMHG | HEART RATE: 60 BPM | WEIGHT: 213 LBS | BODY MASS INDEX: 32.28 KG/M2 | HEIGHT: 68 IN | SYSTOLIC BLOOD PRESSURE: 138 MMHG

## 2021-07-16 DIAGNOSIS — I21.4 NSTEMI (NON-ST ELEVATED MYOCARDIAL INFARCTION) (H): Primary | ICD-10-CM

## 2021-07-16 PROCEDURE — 99214 OFFICE O/P EST MOD 30 MIN: CPT | Performed by: INTERNAL MEDICINE

## 2021-07-16 ASSESSMENT — MIFFLIN-ST. JEOR: SCORE: 1800.66

## 2021-07-16 NOTE — LETTER
7/16/2021    Physician No Ref-Primary  No address on file    RE: Stepan Pablo       Dear Colleague,    I had the pleasure of seeing Stepan Pablo in the [facility].    HPI and Plan:   It is a pleasure to see this very pleasant 51-year-old gentleman for follow-up of coronary artery disease.    I reviewed his excellent medical records with him by my colleagues Yasemin Pinzon and Inez Helton.  In January 2020 he was ice-skating when he developed typical symptoms of left-sided chest discomfort with radiation to his left arm.  After 5 hours or so the symptoms he presented to our hospital and his peak troponin was 3.  EKG showed nonspecific ST segment changes.  In angiography the culprit artery was a small lateral branch of OM1.  It was not intervened upon.  There is mild nonobstructive disease elsewhere.  LVEF is normal overall though inferolateral wall hypokinesis was noted.    Since that episode he has been completely free of chest pain.  Walks an hour a day and continues to skate with his young children in the winter.  He feels well.  All medications are well-tolerated without any side effects.  He is still on 2 antiplatelet therapy and I told him that he can stop taking Plavix.    His cardiac examination is normal.    Reviewed his medications with him.  Aspirin and rosuvastatin will be lifelong medications.  Lisinopril will probably be as well.  If his blood pressure is well controlled maybe we can stop metoprolol in about 3 to 5 years.    Blood pressure appears under decent control.  LDL 70 with a total cholesterol 140 of these numbers are adequate.    Obesity is noted with a body mass index of 32.4.  I advised target weight loss of at least 10 pounds which I hope he can achieve prior to clinic attendance in a years time.  Look forward to seeing him then.  No imaging or stress test is necessary at this time as long as he is asymptomatic.        No orders of  the defined types were placed in this encounter.      No orders of the defined types were placed in this encounter.      Encounter Diagnosis   Name Primary?     NSTEMI (non-ST elevated myocardial infarction) (H) Yes       CURRENT MEDICATIONS:  Current Outpatient Medications   Medication Sig Dispense Refill     aspirin (ASA) 81 MG EC tablet Take 1 tablet (81 mg) by mouth daily 30 tablet 0     Fexofenadine HCl (ALLEGRA PO) Take 180 mg by mouth At Bedtime        FLONASE INHA 50 MCG/DOSE NA Spray 1 spray in nostril At Bedtime        ketotifen (ZADITOR/REFRESH ANTI-ITCH) 0.025 % ophthalmic solution Place 1 drop into both eyes At Bedtime       lisinopril (ZESTRIL) 10 MG tablet Take 1 tablet (10 mg) by mouth daily 90 tablet 0     metoprolol succinate ER (TOPROL-XL) 25 MG 24 hr tablet Take 1 tablet (25 mg) by mouth daily 90 tablet 0     rosuvastatin (CRESTOR) 40 MG tablet Take 1 tablet (40 mg) by mouth daily 90 tablet 0     nitroGLYcerin (NITROSTAT) 0.4 MG sublingual tablet For chest pain place 1 tablet under the tongue every 5 minutes for 3 doses. If symptoms persist 5 minutes after 1st dose call 911. (Patient not taking: Reported on 7/16/2021) 20 tablet 0       ALLERGIES   No Known Allergies    PAST MEDICAL HISTORY:  Past Medical History:   Diagnosis Date     Benign essential hypertension 04/10/2020     Coronary artery disease involving native coronary artery of native heart without angina pectoris     NSTEMI 1/2020; cardiac cath=medical management     Hyperlipidemia LDL goal <70 04/10/2020       PAST SURGICAL HISTORY:  Past Surgical History:   Procedure Laterality Date     CV CORONARY ANGIOGRAM N/A 1/10/2020    Procedure: Coronary Angiogram;  Surgeon: Nico Woodard MD;  Location: [facility] CARDIAC CATH LAB     CV INSTANTANEOUS WAVE-FREE RATIO N/A 1/10/2020    Procedure: Instantaneous Wave-Free Ratio;  Surgeon: Nico Woodard MD;  Location: [facility] CARDIAC CATH LAB     CV LEFT HEART CATH N/A 1/10/2020     Procedure: Left Heart Cath;  Surgeon: Nico Woodard MD;  Location:  HEART CARDIAC CATH LAB     CV LEFT VENTRICULOGRAM N/A 1/10/2020    Procedure: Left Ventriculogram;  Surgeon: Nico Woodard MD;  Location:  HEART CARDIAC CATH LAB     ENT SURGERY      abcess in jaw and teeth pulled     HERNIORRHAPHY UMBILICAL N/A 10/12/2016    Procedure: HERNIORRHAPHY UMBILICAL;  Surgeon: Parker Alaniz MD;  Location: [facility] OR       FAMILY HISTORY:  Family History   Problem Relation Age of Onset     Thyroid Disease Sister      Heart Disease Maternal Grandmother        SOCIAL HISTORY:  Social History     Socioeconomic History     Marital status:      Spouse name: None     Number of children: None     Years of education: None     Highest education level: None   Occupational History     None   Tobacco Use     Smoking status: Never Smoker     Smokeless tobacco: Never Used   Substance and Sexual Activity     Alcohol use: Yes     Comment: occ     Drug use: No     Sexual activity: None   Other Topics Concern     Parent/sibling w/ CABG, MI or angioplasty before 65F 55M? Not Asked   Social History Narrative     None     Social Determinants of Health     Financial Resource Strain:      Difficulty of Paying Living Expenses:    Food Insecurity:      Worried About Running Out of Food in the Last Year:      Ran Out of Food in the Last Year:    Transportation Needs:      Lack of Transportation (Medical):      Lack of Transportation (Non-Medical):    Physical Activity:      Days of Exercise per Week:      Minutes of Exercise per Session:    Stress:      Feeling of Stress :    Social Connections:      Frequency of Communication with Friends and Family:      Frequency of Social Gatherings with Friends and Family:      Attends Jainism Services:      Active Member of Clubs or Organizations:      Attends Club or Organization Meetings:      Marital Status:    Intimate Partner Violence:      Fear of Current or Ex-Partner:   "    Emotionally Abused:      Physically Abused:      Sexually Abused:        Review of Systems:  Skin:  Negative     Eyes:  Negative    ENT:  Negative    Respiratory:  Negative    Cardiovascular:  Negative    Gastroenterology: Negative    Genitourinary:  Negative    Musculoskeletal:  Negative    Neurologic:  Negative    Psychiatric:  Negative    Heme/Lymph/Imm:  Negative    Endocrine:  Negative      Physical Exam:  Vitals: /69 (BP Location: Right arm, Cuff Size: Adult Regular)   Pulse 60   Ht 1.727 m (5' 8\")   Wt 96.6 kg (213 lb)   BMI 32.39 kg/m      Constitutional:  cooperative, alert and oriented, well developed, well nourished, in no acute distress        Skin:  warm and dry to the touch, no apparent skin lesions or masses noted          Head:  normocephalic, no masses or lesions        Eyes:  pupils equal and round, conjunctivae and lids unremarkable, sclera white, no xanthalasma, EOMS intact, no nystagmus        Lymph:No Cervical lymphadenopathy present     ENT:  no pallor or cyanosis, dentition good        Neck:  carotid pulses are full and equal bilaterally, JVP normal, no carotid bruit        Respiratory:  normal breath sounds, clear to auscultation, normal A-P diameter, normal symmetry, normal respiratory excursion, no use of accessory muscles         Cardiac: regular rhythm, normal S1/S2, no S3 or S4, apical impulse not displaced, no murmurs, gallops or rubs                pulses full and equal, no bruits auscultated                                        GI:  abdomen soft, non-tender, BS normoactive, no mass, no HSM, no bruits        Extremities and Muscular Skeletal:  no deformities, clubbing, cyanosis, erythema observed              Neurological:  no gross motor deficits        Psych:  Alert and Oriented x 3        Recent Lab Results:  LIPID RESULTS:  Lab Results   Component Value Date    CHOL 140 07/14/2021    CHOL 155 04/21/2020    HDL 45 07/14/2021    HDL 46 04/21/2020    LDL 70 07/14/2021 "    LDL 62 04/21/2020    TRIG 126 07/14/2021    TRIG 233 (H) 04/21/2020       LIVER ENZYME RESULTS:  Lab Results   Component Value Date    AST 36 01/10/2020    ALT 26 04/21/2020       CBC RESULTS:  Lab Results   Component Value Date    WBC 10.0 01/10/2020    RBC 4.65 01/10/2020    HGB 13.9 01/10/2020    HCT 41.6 01/10/2020    MCV 90 01/10/2020    MCH 29.9 01/10/2020    MCHC 33.4 01/10/2020    RDW 13.2 01/10/2020     01/10/2020       BMP RESULTS:  Lab Results   Component Value Date     07/14/2021     01/22/2020    POTASSIUM 3.7 07/14/2021    POTASSIUM 3.7 01/22/2020    CHLORIDE 109 07/14/2021    CHLORIDE 106 01/22/2020    CO2 29 07/14/2021    CO2 29 01/22/2020    ANIONGAP 2 (L) 07/14/2021    ANIONGAP 10.7 01/22/2020    GLC 96 07/14/2021    GLC 95 01/22/2020    BUN 17 07/14/2021    BUN 14 01/22/2020    CR 1.03 07/14/2021    CR 1.15 01/22/2020    GFRESTIMATED 84 07/14/2021    GFRESTIMATED 68 01/22/2020    GFRESTBLACK 82 01/22/2020    NANDINI 9.0 07/14/2021    NANDINI 9.3 01/22/2020        A1C RESULTS:  Lab Results   Component Value Date    A1C 5.9 (H) 01/10/2020       INR RESULTS:  No results found for: INR        CC  Inez Helton MD  [address]  [place] [id number]                    Thank you for allowing me to participate in the care of your patient.      Sincerely,     DR TUTU MCCURDY MD     [facility]  cc:   Inez Helton MD  [address]  [place] [id number]

## 2021-07-16 NOTE — PROGRESS NOTES
HPI and Plan:   It is a pleasure to see this very pleasant 51-year-old gentleman for follow-up of coronary artery disease.    I reviewed his excellent medical records with him by my colleagues Yasemin Pinzon and Inez Helton.  In January 2020 he was ice-skating when he developed typical symptoms of left-sided chest discomfort with radiation to his left arm.  After 5 hours or so the symptoms he presented to our hospital and his peak troponin was 3.  EKG showed nonspecific ST segment changes.  In angiography the culprit artery was a small lateral branch of OM1.  It was not intervened upon.  There is mild nonobstructive disease elsewhere.  LVEF is normal overall though inferolateral wall hypokinesis was noted.    Since that episode he has been completely free of chest pain.  Walks an hour a day and continues to skate with his young children in the winter.  He feels well.  All medications are well-tolerated without any side effects.  He is still on 2 antiplatelet therapy and I told him that he can stop taking Plavix.    His cardiac examination is normal.    Reviewed his medications with him.  Aspirin and rosuvastatin will be lifelong medications.  Lisinopril will probably be as well.  If his blood pressure is well controlled maybe we can stop metoprolol in about 3 to 5 years.    Blood pressure appears under decent control.  LDL 70 with a total cholesterol 140 of these numbers are adequate.    Obesity is noted with a body mass index of 32.4.  I advised target weight loss of at least 10 pounds which I hope he can achieve prior to clinic attendance in a years time.  Look forward to seeing him then.  No imaging or stress test is necessary at this time as long as he is asymptomatic.        No orders of the defined types were placed in this encounter.      No orders of the defined types were placed in this encounter.      Encounter Diagnosis   Name Primary?     NSTEMI (non-ST elevated myocardial infarction) (H) Yes       CURRENT  MEDICATIONS:  Current Outpatient Medications   Medication Sig Dispense Refill     aspirin (ASA) 81 MG EC tablet Take 1 tablet (81 mg) by mouth daily 30 tablet 0     Fexofenadine HCl (ALLEGRA PO) Take 180 mg by mouth At Bedtime        FLONASE INHA 50 MCG/DOSE NA Spray 1 spray in nostril At Bedtime        ketotifen (ZADITOR/REFRESH ANTI-ITCH) 0.025 % ophthalmic solution Place 1 drop into both eyes At Bedtime       lisinopril (ZESTRIL) 10 MG tablet Take 1 tablet (10 mg) by mouth daily 90 tablet 0     metoprolol succinate ER (TOPROL-XL) 25 MG 24 hr tablet Take 1 tablet (25 mg) by mouth daily 90 tablet 0     rosuvastatin (CRESTOR) 40 MG tablet Take 1 tablet (40 mg) by mouth daily 90 tablet 0     nitroGLYcerin (NITROSTAT) 0.4 MG sublingual tablet For chest pain place 1 tablet under the tongue every 5 minutes for 3 doses. If symptoms persist 5 minutes after 1st dose call 911. (Patient not taking: Reported on 7/16/2021) 20 tablet 0       ALLERGIES   No Known Allergies    PAST MEDICAL HISTORY:  Past Medical History:   Diagnosis Date     Benign essential hypertension 04/10/2020     Coronary artery disease involving native coronary artery of native heart without angina pectoris     NSTEMI 1/2020; cardiac cath=medical management     Hyperlipidemia LDL goal <70 04/10/2020       PAST SURGICAL HISTORY:  Past Surgical History:   Procedure Laterality Date     CV CORONARY ANGIOGRAM N/A 1/10/2020    Procedure: Coronary Angiogram;  Surgeon: Nico Woodard MD;  Location: [facility] CARDIAC CATH LAB     CV INSTANTANEOUS WAVE-FREE RATIO N/A 1/10/2020    Procedure: Instantaneous Wave-Free Ratio;  Surgeon: Nico Woodard MD;  Location: [facility] CARDIAC CATH LAB     CV LEFT HEART CATH N/A 1/10/2020    Procedure: Left Heart Cath;  Surgeon: Nico Woodard MD;  Location: [facility] CARDIAC CATH LAB     CV LEFT VENTRICULOGRAM N/A 1/10/2020    Procedure: Left Ventriculogram;  Surgeon: Nico Woodard MD;  Location: [facility]  CARDIAC CATH LAB     ENT SURGERY      abcess in jaw and teeth pulled     HERNIORRHAPHY UMBILICAL N/A 10/12/2016    Procedure: HERNIORRHAPHY UMBILICAL;  Surgeon: Parker Alaniz MD;  Location: [facility] OR       FAMILY HISTORY:  Family History   Problem Relation Age of Onset     Thyroid Disease Sister      Heart Disease Maternal Grandmother        SOCIAL HISTORY:  Social History     Socioeconomic History     Marital status:      Spouse name: None     Number of children: None     Years of education: None     Highest education level: None   Occupational History     None   Tobacco Use     Smoking status: Never Smoker     Smokeless tobacco: Never Used   Substance and Sexual Activity     Alcohol use: Yes     Comment: occ     Drug use: No     Sexual activity: None   Other Topics Concern     Parent/sibling w/ CABG, MI or angioplasty before 65F 55M? Not Asked   Social History Narrative     None     Social Determinants of Health     Financial Resource Strain:      Difficulty of Paying Living Expenses:    Food Insecurity:      Worried About Running Out of Food in the Last Year:      Ran Out of Food in the Last Year:    Transportation Needs:      Lack of Transportation (Medical):      Lack of Transportation (Non-Medical):    Physical Activity:      Days of Exercise per Week:      Minutes of Exercise per Session:    Stress:      Feeling of Stress :    Social Connections:      Frequency of Communication with Friends and Family:      Frequency of Social Gatherings with Friends and Family:      Attends Taoist Services:      Active Member of Clubs or Organizations:      Attends Club or Organization Meetings:      Marital Status:    Intimate Partner Violence:      Fear of Current or Ex-Partner:      Emotionally Abused:      Physically Abused:      Sexually Abused:        Review of Systems:  Skin:  Negative     Eyes:  Negative    ENT:  Negative    Respiratory:  Negative    Cardiovascular:  Negative    Gastroenterology:  "Negative    Genitourinary:  Negative    Musculoskeletal:  Negative    Neurologic:  Negative    Psychiatric:  Negative    Heme/Lymph/Imm:  Negative    Endocrine:  Negative      Physical Exam:  Vitals: /69 (BP Location: Right arm, Cuff Size: Adult Regular)   Pulse 60   Ht 1.727 m (5' 8\")   Wt 96.6 kg (213 lb)   BMI 32.39 kg/m      Constitutional:  cooperative, alert and oriented, well developed, well nourished, in no acute distress        Skin:  warm and dry to the touch, no apparent skin lesions or masses noted          Head:  normocephalic, no masses or lesions        Eyes:  pupils equal and round, conjunctivae and lids unremarkable, sclera white, no xanthalasma, EOMS intact, no nystagmus        Lymph:No Cervical lymphadenopathy present     ENT:  no pallor or cyanosis, dentition good        Neck:  carotid pulses are full and equal bilaterally, JVP normal, no carotid bruit        Respiratory:  normal breath sounds, clear to auscultation, normal A-P diameter, normal symmetry, normal respiratory excursion, no use of accessory muscles         Cardiac: regular rhythm, normal S1/S2, no S3 or S4, apical impulse not displaced, no murmurs, gallops or rubs                pulses full and equal, no bruits auscultated                                        GI:  abdomen soft, non-tender, BS normoactive, no mass, no HSM, no bruits        Extremities and Muscular Skeletal:  no deformities, clubbing, cyanosis, erythema observed              Neurological:  no gross motor deficits        Psych:  Alert and Oriented x 3        Recent Lab Results:  LIPID RESULTS:  Lab Results   Component Value Date    CHOL 140 07/14/2021    CHOL 155 04/21/2020    HDL 45 07/14/2021    HDL 46 04/21/2020    LDL 70 07/14/2021    LDL 62 04/21/2020    TRIG 126 07/14/2021    TRIG 233 (H) 04/21/2020       LIVER ENZYME RESULTS:  Lab Results   Component Value Date    AST 36 01/10/2020    ALT 26 04/21/2020       CBC RESULTS:  Lab Results   Component Value " Date    WBC 10.0 01/10/2020    RBC 4.65 01/10/2020    HGB 13.9 01/10/2020    HCT 41.6 01/10/2020    MCV 90 01/10/2020    MCH 29.9 01/10/2020    MCHC 33.4 01/10/2020    RDW 13.2 01/10/2020     01/10/2020       BMP RESULTS:  Lab Results   Component Value Date     07/14/2021     01/22/2020    POTASSIUM 3.7 07/14/2021    POTASSIUM 3.7 01/22/2020    CHLORIDE 109 07/14/2021    CHLORIDE 106 01/22/2020    CO2 29 07/14/2021    CO2 29 01/22/2020    ANIONGAP 2 (L) 07/14/2021    ANIONGAP 10.7 01/22/2020    GLC 96 07/14/2021    GLC 95 01/22/2020    BUN 17 07/14/2021    BUN 14 01/22/2020    CR 1.03 07/14/2021    CR 1.15 01/22/2020    GFRESTIMATED 84 07/14/2021    GFRESTIMATED 68 01/22/2020    GFRESTBLACK 82 01/22/2020    NANDINI 9.0 07/14/2021    NANDINI 9.3 01/22/2020        A1C RESULTS:  Lab Results   Component Value Date    A1C 5.9 (H) 01/10/2020       INR RESULTS:  No results found for: INR        CC  Inez Helton MD  [address]  [place] [id number]

## 2021-09-19 ENCOUNTER — HEALTH MAINTENANCE LETTER (OUTPATIENT)
Age: 51
End: 2021-09-19

## 2021-09-30 DIAGNOSIS — I21.4 NSTEMI (NON-ST ELEVATED MYOCARDIAL INFARCTION) (H): ICD-10-CM

## 2021-09-30 DIAGNOSIS — I10 ESSENTIAL HYPERTENSION: ICD-10-CM

## 2021-09-30 DIAGNOSIS — E78.5 HYPERLIPIDEMIA LDL GOAL <70: ICD-10-CM

## 2021-09-30 RX ORDER — LISINOPRIL 10 MG/1
10 TABLET ORAL DAILY
Qty: 90 TABLET | Refills: 3 | Status: SHIPPED | OUTPATIENT
Start: 2021-09-30 | End: 2022-02-11

## 2021-09-30 RX ORDER — METOPROLOL SUCCINATE 25 MG/1
25 TABLET, EXTENDED RELEASE ORAL DAILY
Qty: 90 TABLET | Refills: 2 | Status: SHIPPED | OUTPATIENT
Start: 2021-09-30 | End: 2022-06-29

## 2021-09-30 RX ORDER — ROSUVASTATIN CALCIUM 40 MG/1
40 TABLET, COATED ORAL DAILY
Qty: 90 TABLET | Refills: 3 | Status: SHIPPED | OUTPATIENT
Start: 2021-09-30 | End: 2022-09-27

## 2022-02-10 ENCOUNTER — NURSE TRIAGE (OUTPATIENT)
Dept: CARDIOLOGY | Facility: CLINIC | Age: 52
End: 2022-02-10
Payer: COMMERCIAL

## 2022-02-10 NOTE — TELEPHONE ENCOUNTER
Update appreciated and agree with triage RN's recs.     FYI to MYRON Rosario, who will be seeing Stepan tomorrow.     KERLINE CollinsN, RN, PHN, HNB-BC   2/10/2022 at 10:44 AM

## 2022-02-10 NOTE — TELEPHONE ENCOUNTER
"Patient's wife called because Stepan's blood pressure has been running high and they are concerned. She did not have numbers other than the systolic- running up to 180, 157. Patient is on Metoprolol 25 mg daily. He did take an extra Metoprolol yesterday and she thinks it did bring his BP done. He is sleeping right now so she doesn't have the exact numbers. Only other symptom is intermittent headache. She wanted us to know he did have Covid in October. He has not had any other changes in his life to cause increase in his blood pressure. I told her since he does have an appointment scheduled tomorrow at 12:30 it is ok for him to take an extra 25 mg of Metoprolol again today if his BP is still high. He is to write down when he takes his blood pressure, what it is, when he takes his medications, and any other symptoms he is having. I will send a message to Dr. Wylie's nurse team as well to call him if they have any other advice for him. I told her if his blood pressure remains high and they feel he needs to be seen right away to go to the ER.     Initial Assessment Questions 1. BLOOD PRESSURE: \"What is the blood pressure?\" \"Did you take at least two measurements 5 minutes apart?\"systolic 180, 157   2. ONSET: \"When did you take your blood pressure?\"not sure  3. HOW: \"How did you obtain the blood pressure?\" (e.g., visiting nurse, automatic home BP monitor)didn't ask  4. HISTORY: \"Do you have a history of high blood pressure?\"yes  5. MEDICATIONS: \"Are you taking any medications for blood pressure?\" \"Have you missed any doses recently?\"yes   6. OTHER SYMPTOMS: \"Do you have any symptoms?\" (e.g., headache, chest pain, blurred vision, difficulty breathing, weakness)  Off and on headache  "

## 2022-02-10 NOTE — TELEPHONE ENCOUNTER
Additional Information    Negative: Sounds like a life-threatening emergency to the triager    Negative: Pregnant > 20 weeks or postpartum (< 6 weeks after delivery) and new hand or face swelling    Negative: Pregnant > 20 weeks and BP > 140/90    Negative: Systolic BP >= 160 OR Diastolic >= 100, and any cardiac or neurologic symptoms (e.g., chest pain, difficulty breathing, unsteady gait, blurred vision)    Negative: Patient sounds very sick or weak to the triager    Protocols used: HIGH BLOOD PRESSURE-A-[place]

## 2022-02-11 ENCOUNTER — OFFICE VISIT (OUTPATIENT)
Dept: CARDIOLOGY | Facility: CLINIC | Age: 52
End: 2022-02-11
Payer: COMMERCIAL

## 2022-02-11 VITALS
HEIGHT: 68 IN | HEART RATE: 89 BPM | SYSTOLIC BLOOD PRESSURE: 158 MMHG | WEIGHT: 211.2 LBS | BODY MASS INDEX: 32.01 KG/M2 | DIASTOLIC BLOOD PRESSURE: 102 MMHG | OXYGEN SATURATION: 96 %

## 2022-02-11 DIAGNOSIS — E78.5 HYPERLIPIDEMIA LDL GOAL <70: Primary | ICD-10-CM

## 2022-02-11 DIAGNOSIS — I25.10 CORONARY ARTERY DISEASE INVOLVING NATIVE HEART WITHOUT ANGINA PECTORIS, UNSPECIFIED VESSEL OR LESION TYPE: ICD-10-CM

## 2022-02-11 DIAGNOSIS — I10 ESSENTIAL HYPERTENSION: ICD-10-CM

## 2022-02-11 PROCEDURE — 99215 OFFICE O/P EST HI 40 MIN: CPT | Performed by: PHYSICIAN ASSISTANT

## 2022-02-11 RX ORDER — LISINOPRIL 20 MG/1
20 TABLET ORAL DAILY
Qty: 90 TABLET | Refills: 3 | Status: SHIPPED | OUTPATIENT
Start: 2022-02-11 | End: 2022-04-18

## 2022-02-11 ASSESSMENT — MIFFLIN-ST. JEOR: SCORE: 1792.5

## 2022-02-11 NOTE — PATIENT INSTRUCTIONS
Today's Plan:   1) Increase lisinopril to 2 tablets (20 mg) once every morning.   2) Get labs to make sure there are no other reasons for you to have high blood pressure.   3) Return in 2-4 weeks for follow up.   4) Please establish care with a primary provider.   5) If headaches get worse, go to the emergency department.     If you have questions or concerns please call my nurse team at [phone].     Scheduling phone number: [phone].  Reminder: Please bring in all current medications, over the counter supplements and vitamin bottles to your next appointment.    It was a pleasure seeing you today!     Marlene Hart PA-C  2/11/2022

## 2022-02-11 NOTE — PROGRESS NOTES
Primary Cardiologist: Dr. Wylie    Reason for Visit: Hypertension    History of Present Illness:   Stepan is a pleasant 51 year old male with past medical history notable for:    # CAD (hx of NSTEMI in 10/2020 with small-vessel OM disease- medical management was recommended)  # Hypertension   # Hyperlipidemia  (at goal on rosuvastatin 40 mg)    Stepan presents to clinic today, stating he notices blood pressure significantly high over the last week.  He also has mild intermittent borderline headache.  He contacted our clinic and was arranged to see me today to discuss his symptoms.  He also tells me that he had a fall 3 weeks ago, hitting the back side of his head.  He did not have loss of consciousness.  He had no vision changes, nausea, emesis, confusion, or amnesia following his fall.  He was wearing a helmet which he thinks protected him.  He had no contusion, hematoma, or ecchymosis on his head.  He has not seen anybody since his fall.  He has no established primary care provider.  He denies any neurologic changes.  He is not sure if his headache started after the fall or not.  He tells me he has not been very active since he had Covid earlier in the fall this year.  He is to go for walks for an hour almost every day.  His metoprolol was also decreased in July of this year.  He tells me his highest systolic was 180.  He denies chest discomfort, palpitations, presyncope, syncope, orthopnea, abdominal distention, peripheral edema, or bleeding issues.    Assessment and Plan:  Stepan is a pleasant 51 year old male with past medical history notable for:    # CAD (hx of NSTEMI in 10/2020 with small-vessel OM disease- medical management was recommended)  # Hypertension   # Hyperlipidemia  (at goal on rosuvastatin 40 mg)    Stepan's blood pressure continues to be significantly high.  It is unclear what may have caused this sudden rise.  He is less active than usual and he had a slight reduction in his medications earlier this  summer.  This may contribute to his rising blood pressure over time.  It sounds like he had a concussion several weeks ago but had no neurologic sequelae from this.  Not sure if his mild intermittent headaches are part of post concussion or due to his significantly high blood pressure.  Although decrease in activity and medication adjustments could explain his blood pressure rise and not sure if it is enough to create such a big jump.  He denies high sodium diet.  He usually has 1 cup of coffee but he has not been drinking coffee over the last few weeks.  He denies any energy drinks or herbal supplements.  I will have him arrange for secondary hypertension lab work-up.  We will also increase his lisinopril from 10 mg to 20 mg daily.  He will return to clinic in 2 to 3 weeks with repeat blood work.  I have also encouraged him to establish care with a primary care provider given his history of CAD.  He should be screened for diabetes and also be evaluated for other primary prevention measures.  He verbalized understanding and was in agreement with this plan.    45 minutes spent on the date of the encounter with chart review, patient visit, care coordination, and documentation.    This note was completed in part using Dragon voice recognition software. Although reviewed after completion, some word and grammatical errors may occur.    Orders this Visit:  No orders of the defined types were placed in this encounter.    No orders of the defined types were placed in this encounter.    There are no discontinued medications.      No diagnosis found.    CURRENT MEDICATIONS:  Current Outpatient Medications   Medication Sig Dispense Refill     aspirin (ASA) 81 MG EC tablet Take 1 tablet (81 mg) by mouth daily 30 tablet 0     Fexofenadine HCl (ALLEGRA PO) Take 180 mg by mouth At Bedtime        FLONASE INHA 50 MCG/DOSE NA Spray 1 spray in nostril At Bedtime        ketotifen (ZADITOR/REFRESH ANTI-ITCH) 0.025 % ophthalmic solution  Place 1 drop into both eyes At Bedtime       lisinopril (ZESTRIL) 10 MG tablet Take 1 tablet (10 mg) by mouth daily 90 tablet 3     metoprolol succinate ER (TOPROL-XL) 25 MG 24 hr tablet Take 1 tablet (25 mg) by mouth daily 90 tablet 2     nitroGLYcerin (NITROSTAT) 0.4 MG sublingual tablet For chest pain place 1 tablet under the tongue every 5 minutes for 3 doses. If symptoms persist 5 minutes after 1st dose call 911. (Patient not taking: Reported on 7/16/2021) 20 tablet 0     rosuvastatin (CRESTOR) 40 MG tablet Take 1 tablet (40 mg) by mouth daily 90 tablet 3       ALLERGIES   No Known Allergies    PAST MEDICAL HISTORY:  Past Medical History:   Diagnosis Date     Benign essential hypertension 04/10/2020     Coronary artery disease involving native coronary artery of native heart without angina pectoris     NSTEMI 1/2020; cardiac cath=medical management     Hyperlipidemia LDL goal <70 04/10/2020       PAST SURGICAL HISTORY:  Past Surgical History:   Procedure Laterality Date     CV CORONARY ANGIOGRAM N/A 1/10/2020    Procedure: Coronary Angiogram;  Surgeon: Nico Woodard MD;  Location:  [facility]     CV INSTANTANEOUS WAVE-FREE RATIO N/A 1/10/2020    Procedure: Instantaneous Wave-Free Ratio;  Surgeon: Nico Woodard MD;  Location:  [facility]     CV LEFT HEART CATH N/A 1/10/2020    Procedure: Left Heart Cath;  Surgeon: Nico Woodard MD;  Location:  [facility]     CV LEFT VENTRICULOGRAM N/A 1/10/2020    Procedure: Left Ventriculogram;  Surgeon: Nico Woodard MD;  Location:  [facility]     ENT SURGERY      abcess in jaw and teeth pulled     HERNIORRHAPHY UMBILICAL N/A 10/12/2016    Procedure: HERNIORRHAPHY UMBILICAL;  Surgeon: Parker Alaniz MD;  Location: [facility] OR       FAMILY HISTORY:  Family History   Problem Relation Age of Onset     Thyroid Disease Sister      Heart Disease Maternal Grandmother        SOCIAL HISTORY:  Social  History     Socioeconomic History     Marital status:      Spouse name: Not on file     Number of children: Not on file     Years of education: Not on file     Highest education level: Not on file   Occupational History     Not on file   Tobacco Use     Smoking status: Never Smoker     Smokeless tobacco: Never Used   Substance and Sexual Activity     Alcohol use: Yes     Comment: occ     Drug use: No     Sexual activity: Not on file   Other Topics Concern     Parent/sibling w/ CABG, MI or angioplasty before 65F 55M? Not Asked   Social History Narrative     Not on file     Social Determinants of Health     Financial Resource Strain: Not on file   Food Insecurity: Not on file   Transportation Needs: Not on file   Physical Activity: Not on file   Stress: Not on file   Social Connections: Not on file   Intimate Partner Violence: Not on file   Housing Stability: Not on file       Review of Systems:  Skin:        Eyes:       ENT:       Respiratory:       Cardiovascular:       Gastroenterology:      Genitourinary:       Musculoskeletal:       Neurologic:       Psychiatric:       Heme/Lymph/Imm:       Endocrine:         Physical Exam:  Vitals: There were no vitals taken for this visit.     GEN:  NAD  NECK: No JVD  C/V:  Regular rate and rhythm, no murmur, rub or gallop.  RESP: Clear to auscultation bilaterally without wheezing, rales, or rhonchi.  GI: Abdomen soft, nontender, nondistended.   EXTREM: No pitting LE edema.   NEURO: Alert and oriented, cooperative. No obvious focal deficits.  Brief cranial nerve exam is normal.  PSYCH: Normal affect.  SKIN: Warm and dry.  No ecchymosis or hematoma on the backside of his head.      Recent Lab Results:  LIPID RESULTS:  Lab Results   Component Value Date    CHOL 140 07/14/2021    CHOL 155 04/21/2020    HDL 45 07/14/2021    HDL 46 04/21/2020    LDL 70 07/14/2021    LDL 62 04/21/2020    TRIG 126 07/14/2021    TRIG 233 (H) 04/21/2020       LIVER ENZYME RESULTS:  Lab Results    Component Value Date    AST 36 01/10/2020    ALT 26 04/21/2020       CBC RESULTS:  Lab Results   Component Value Date    WBC 10.0 01/10/2020    RBC 4.65 01/10/2020    HGB 13.9 01/10/2020    HCT 41.6 01/10/2020    MCV 90 01/10/2020    MCH 29.9 01/10/2020    MCHC 33.4 01/10/2020    RDW 13.2 01/10/2020     01/10/2020       BMP RESULTS:  Lab Results   Component Value Date     07/14/2021     01/22/2020    POTASSIUM 3.7 07/14/2021    POTASSIUM 3.7 01/22/2020    CHLORIDE 109 07/14/2021    CHLORIDE 106 01/22/2020    CO2 29 07/14/2021    CO2 29 01/22/2020    ANIONGAP 2 (L) 07/14/2021    ANIONGAP 10.7 01/22/2020    GLC 96 07/14/2021    GLC 95 01/22/2020    BUN 17 07/14/2021    BUN 14 01/22/2020    CR 1.03 07/14/2021    CR 1.15 01/22/2020    GFRESTIMATED 84 07/14/2021    GFRESTIMATED 68 01/22/2020    GFRESTBLACK 82 01/22/2020    NANDINI 9.0 07/14/2021    NANDINI 9.3 01/22/2020        A1C RESULTS:  Lab Results   Component Value Date    A1C 5.9 (H) 01/10/2020       INR RESULTS:  No results found for: INR        Marlene Hart PA-C  February 11, 2022

## 2022-02-11 NOTE — LETTER
2/11/2022    Physician No Ref-Primary  No address on file    RE: Stepan Pablo       Dear Colleague,     I had the pleasure of seeing Stepan Pablo in the [facility].  Primary Cardiologist: Dr. Wylie    Reason for Visit: Hypertension    History of Present Illness:   Steapn is a pleasant 51 year old male with past medical history notable for:    # CAD (hx of NSTEMI in 10/2020 with small-vessel OM disease- medical management was recommended)  # Hypertension   # Hyperlipidemia  (at goal on rosuvastatin 40 mg)    Stepan presents to clinic today, stating he notices blood pressure significantly high over the last week.  He also has mild intermittent borderline headache.  He contacted our clinic and was arranged to see me today to discuss his symptoms.  He also tells me that he had a fall 3 weeks ago, hitting the back side of his head.  He did not have loss of consciousness.  He had no vision changes, nausea, emesis, confusion, or amnesia following his fall.  He was wearing a helmet which he thinks protected him.  He had no contusion, hematoma, or ecchymosis on his head.  He has not seen anybody since his fall.  He has no established primary care provider.  He denies any neurologic changes.  He is not sure if his headache started after the fall or not.  He tells me he has not been very active since he had Covid earlier in the fall this year.  He is to go for walks for an hour almost every day.  His metoprolol was also decreased in July of this year.  He tells me his highest systolic was 180.  He denies chest discomfort, palpitations, presyncope, syncope, orthopnea, abdominal distention, peripheral edema, or bleeding issues.    Assessment and Plan:  Stepan is a pleasant 51 year old male with past medical history notable for:    # CAD (hx of NSTEMI in 10/2020 with small-vessel OM disease- medical management was recommended)  # Hypertension   # Hyperlipidemia  (at goal on rosuvastatin 40 mg)    Stepan's blood  pressure continues to be significantly high.  It is unclear what may have caused this sudden rise.  He is less active than usual and he had a slight reduction in his medications earlier this summer.  This may contribute to his rising blood pressure over time.  It sounds like he had a concussion several weeks ago but had no neurologic sequelae from this.  Not sure if his mild intermittent headaches are part of post concussion or due to his significantly high blood pressure.  Although decrease in activity and medication adjustments could explain his blood pressure rise and not sure if it is enough to create such a big jump.  He denies high sodium diet.  He usually has 1 cup of coffee but he has not been drinking coffee over the last few weeks.  He denies any energy drinks or herbal supplements.  I will have him arrange for secondary hypertension lab work-up.  We will also increase his lisinopril from 10 mg to 20 mg daily.  He will return to clinic in 2 to 3 weeks with repeat blood work.  I have also encouraged him to establish care with a primary care provider given his history of CAD.  He should be screened for diabetes and also be evaluated for other primary prevention measures.  He verbalized understanding and was in agreement with this plan.    45 minutes spent on the date of the encounter with chart review, patient visit, care coordination, and documentation.    This note was completed in part using Dragon voice recognition software. Although reviewed after completion, some word and grammatical errors may occur.    Orders this Visit:  No orders of the defined types were placed in this encounter.    No orders of the defined types were placed in this encounter.    There are no discontinued medications.      No diagnosis found.    CURRENT MEDICATIONS:  Current Outpatient Medications   Medication Sig Dispense Refill     aspirin (ASA) 81 MG EC tablet Take 1 tablet (81 mg) by mouth daily 30 tablet 0     Fexofenadine  HCl (ALLEGRA PO) Take 180 mg by mouth At Bedtime        FLONASE INHA 50 MCG/DOSE NA Spray 1 spray in nostril At Bedtime        ketotifen (ZADITOR/REFRESH ANTI-ITCH) 0.025 % ophthalmic solution Place 1 drop into both eyes At Bedtime       lisinopril (ZESTRIL) 10 MG tablet Take 1 tablet (10 mg) by mouth daily 90 tablet 3     metoprolol succinate ER (TOPROL-XL) 25 MG 24 hr tablet Take 1 tablet (25 mg) by mouth daily 90 tablet 2     nitroGLYcerin (NITROSTAT) 0.4 MG sublingual tablet For chest pain place 1 tablet under the tongue every 5 minutes for 3 doses. If symptoms persist 5 minutes after 1st dose call 911. (Patient not taking: Reported on 7/16/2021) 20 tablet 0     rosuvastatin (CRESTOR) 40 MG tablet Take 1 tablet (40 mg) by mouth daily 90 tablet 3       ALLERGIES   No Known Allergies    PAST MEDICAL HISTORY:  Past Medical History:   Diagnosis Date     Benign essential hypertension 04/10/2020     Coronary artery disease involving native coronary artery of native heart without angina pectoris     NSTEMI 1/2020; cardiac cath=medical management     Hyperlipidemia LDL goal <70 04/10/2020       PAST SURGICAL HISTORY:  Past Surgical History:   Procedure Laterality Date     CV CORONARY ANGIOGRAM N/A 1/10/2020    Procedure: Coronary Angiogram;  Surgeon: Nico Woodard MD;  Location: [facility] CARDIAC CATH LAB     CV INSTANTANEOUS WAVE-FREE RATIO N/A 1/10/2020    Procedure: Instantaneous Wave-Free Ratio;  Surgeon: Nico Woodard MD;  Location:  HE[facility]ARDI[facility] CATH LAB     CV LEFT HEART CATH N/A 1/10/2020    Procedure: Left Heart Cath;  Surgeon: Nico Woodard MD;  Location: [facility] CARDIAC CATH LAB     CV LEFT VENTRICULOGRAM N/A 1/10/2020    Procedure: Left Ventriculogram;  Surgeon: Nico Woodard MD;  Location:  [facility]     ENT SURGERY      abcess in jaw and teeth pulled     HERNIORRHAPHY UMBILICAL N/A 10/12/2016    Procedure: HERNIORRHAPHY UMBILICAL;  Surgeon: Parker Alaniz  MD Raul;  Location:  OR       FAMILY HISTORY:  Family History   Problem Relation Age of Onset     Thyroid Disease Sister      Heart Disease Maternal Grandmother        SOCIAL HISTORY:  Social History     Socioeconomic History     Marital status:      Spouse name: Not on file     Number of children: Not on file     Years of education: Not on file     Highest education level: Not on file   Occupational History     Not on file   Tobacco Use     Smoking status: Never Smoker     Smokeless tobacco: Never Used   Substance and Sexual Activity     Alcohol use: Yes     Comment: occ     Drug use: No     Sexual activity: Not on file   Other Topics Concern     Parent/sibling w/ CABG, MI or angioplasty before 65F 55M? Not Asked   Social History Narrative     Not on file     Social Determinants of Health     Financial Resource Strain: Not on file   Food Insecurity: Not on file   Transportation Needs: Not on file   Physical Activity: Not on file   Stress: Not on file   Social Connections: Not on file   Intimate Partner Violence: Not on file   Housing Stability: Not on file       Review of Systems:  Skin:        Eyes:       ENT:       Respiratory:       Cardiovascular:       Gastroenterology:      Genitourinary:       Musculoskeletal:       Neurologic:       Psychiatric:       Heme/Lymph/Imm:       Endocrine:         Physical Exam:  Vitals: There were no vitals taken for this visit.     GEN:  NAD  NECK: No JVD  C/V:  Regular rate and rhythm, no murmur, rub or gallop.  RESP: Clear to auscultation bilaterally without wheezing, rales, or rhonchi.  GI: Abdomen soft, nontender, nondistended.   EXTREM: No pitting LE edema.   NEURO: Alert and oriented, cooperative. No obvious focal deficits.  Brief cranial nerve exam is normal.  PSYCH: Normal affect.  SKIN: Warm and dry.  No ecchymosis or hematoma on the backside of his head.      Recent Lab Results:  LIPID RESULTS:  Lab Results   Component Value Date    CHOL 140 07/14/2021     CHOL 155 04/21/2020    HDL 45 07/14/2021    HDL 46 04/21/2020    LDL 70 07/14/2021    LDL 62 04/21/2020    TRIG 126 07/14/2021    TRIG 233 (H) 04/21/2020       LIVER ENZYME RESULTS:  Lab Results   Component Value Date    AST 36 01/10/2020    ALT 26 04/21/2020       CBC RESULTS:  Lab Results   Component Value Date    WBC 10.0 01/10/2020    RBC 4.65 01/10/2020    HGB 13.9 01/10/2020    HCT 41.6 01/10/2020    MCV 90 01/10/2020    MCH 29.9 01/10/2020    MCHC 33.4 01/10/2020    RDW 13.2 01/10/2020     01/10/2020       BMP RESULTS:  Lab Results   Component Value Date     07/14/2021     01/22/2020    POTASSIUM 3.7 07/14/2021    POTASSIUM 3.7 01/22/2020    CHLORIDE 109 07/14/2021    CHLORIDE 106 01/22/2020    CO2 29 07/14/2021    CO2 29 01/22/2020    ANIONGAP 2 (L) 07/14/2021    ANIONGAP 10.7 01/22/2020    GLC 96 07/14/2021    GLC 95 01/22/2020    BUN 17 07/14/2021    BUN 14 01/22/2020    CR 1.03 07/14/2021    CR 1.15 01/22/2020    GFRESTIMATED 84 07/14/2021    GFRESTIMATED 68 01/22/2020    GFRESTBLACK 82 01/22/2020    NANDINI 9.0 07/14/2021    NANDINI 9.3 01/22/2020        A1C RESULTS:  Lab Results   Component Value Date    A1C 5.9 (H) 01/10/2020       INR RESULTS:  No results found for: INR        Marlene Hart PA-C  February 11, 2022         Thank you for allowing me to participate in the care of your patient.      Sincerely,     Marlene Hart PA-C     [facility]  cc:   No referring provider defined for this encounter.

## 2022-02-14 ENCOUNTER — LAB (OUTPATIENT)
Dept: LAB | Facility: CLINIC | Age: 52
End: 2022-02-14
Attending: PHYSICIAN ASSISTANT
Payer: COMMERCIAL

## 2022-02-14 DIAGNOSIS — E78.5 HYPERLIPIDEMIA LDL GOAL <70: ICD-10-CM

## 2022-02-14 DIAGNOSIS — I10 ESSENTIAL HYPERTENSION: ICD-10-CM

## 2022-02-14 DIAGNOSIS — I25.10 CORONARY ARTERY DISEASE INVOLVING NATIVE HEART WITHOUT ANGINA PECTORIS, UNSPECIFIED VESSEL OR LESION TYPE: ICD-10-CM

## 2022-02-14 LAB
ANION GAP SERPL CALCULATED.3IONS-SCNC: 2 MMOL/L (ref 3–14)
BUN SERPL-MCNC: 16 MG/DL (ref 7–30)
CALCIUM SERPL-MCNC: 9 MG/DL (ref 8.5–10.1)
CHLORIDE BLD-SCNC: 108 MMOL/L (ref 94–109)
CO2 SERPL-SCNC: 30 MMOL/L (ref 20–32)
CREAT SERPL-MCNC: 1.22 MG/DL (ref 0.66–1.25)
GFR SERPL CREATININE-BSD FRML MDRD: 72 ML/MIN/1.73M2
GLUCOSE BLD-MCNC: 104 MG/DL (ref 70–99)
POTASSIUM BLD-SCNC: 4 MMOL/L (ref 3.4–5.3)
SODIUM SERPL-SCNC: 140 MMOL/L (ref 133–144)

## 2022-02-14 PROCEDURE — 82565 ASSAY OF CREATININE: CPT

## 2022-02-14 PROCEDURE — 82384 ASSAY THREE CATECHOLAMINES: CPT

## 2022-02-14 PROCEDURE — 84585 ASSAY OF URINE VMA: CPT

## 2022-02-14 PROCEDURE — 84244 ASSAY OF RENIN: CPT

## 2022-02-14 PROCEDURE — 80048 BASIC METABOLIC PNL TOTAL CA: CPT

## 2022-02-14 PROCEDURE — 82384 ASSAY THREE CATECHOLAMINES: CPT | Mod: 91

## 2022-02-14 PROCEDURE — 83835 ASSAY OF METANEPHRINES: CPT

## 2022-02-14 PROCEDURE — 36415 COLL VENOUS BLD VENIPUNCTURE: CPT

## 2022-02-14 PROCEDURE — 82088 ASSAY OF ALDOSTERONE: CPT

## 2022-02-15 LAB
ALDOST SERPL-MCNC: 10.6 NG/DL (ref 0–31)
VMA RAW: 1.28 UG/TUBE
VMA/CREAT UR: 3.3 MG/G CR (ref 0–8.1)

## 2022-02-16 LAB — RENIN PLAS-CCNC: 0.1 NG/ML/HR

## 2022-02-17 LAB
CATECHOLS PLAS-IMP: ABNORMAL
CATECHOLS UR-IMP: NORMAL
CREAT 24H UR-MRATE: NORMAL MG/D
CREAT 24H UR-MRATE: NORMAL MG/D
CREAT UR-MCNC: 37 MG/DL
CREAT UR-MCNC: 39 MG/DL
DOPAMINE 24H UR-MRATE: NORMAL UG/D
DOPAMINE SERPL-MCNC: 21 PG/ML
DOPAMINE UR-MCNC: 52 UG/L
DOPAMINE/CREAT UR: 141 UG/G CRT
EPINEPH 24H UR-MRATE: NORMAL
EPINEPH PLAS-MCNC: 60 PG/ML
EPINEPH UR-MCNC: 2 UG/L
EPINEPH/CREAT UR: 5 UG/G CRT
METANEPH 24H UR-MCNC: 25 UG/L
METANEPH 24H UR-MRATE: NORMAL UG/D
METANEPH+NORMETANEPH UR-IMP: NORMAL
METANEPH/CREAT 24H UR: 64 UG/G CRT
NOREPINEPH 24H UR-MRATE: NORMAL UG/D
NOREPINEPH PLAS-MCNC: 600 PG/ML
NOREPINEPH UR-MCNC: 15 UG/L
NOREPINEPH/CREAT UR: 41 UG/G CRT
NORMETANEPHRINE 24H UR-MCNC: 53 UG/L
NORMETANEPHRINE 24H UR-MRATE: NORMAL UG/D
NORMETANEPHRINE/CREAT 24H UR: 136 UG/G CRT

## 2022-03-02 ENCOUNTER — OFFICE VISIT (OUTPATIENT)
Dept: CARDIOLOGY | Facility: CLINIC | Age: 52
End: 2022-03-02
Payer: COMMERCIAL

## 2022-03-02 VITALS
DIASTOLIC BLOOD PRESSURE: 104 MMHG | BODY MASS INDEX: 31.37 KG/M2 | SYSTOLIC BLOOD PRESSURE: 156 MMHG | OXYGEN SATURATION: 99 % | HEART RATE: 87 BPM | WEIGHT: 207 LBS | HEIGHT: 68 IN

## 2022-03-02 DIAGNOSIS — I10 ESSENTIAL HYPERTENSION: Primary | ICD-10-CM

## 2022-03-02 DIAGNOSIS — I25.10 CORONARY ARTERY DISEASE INVOLVING NATIVE HEART WITHOUT ANGINA PECTORIS, UNSPECIFIED VESSEL OR LESION TYPE: ICD-10-CM

## 2022-03-02 DIAGNOSIS — E78.5 HYPERLIPIDEMIA LDL GOAL <70: ICD-10-CM

## 2022-03-02 PROCEDURE — 99215 OFFICE O/P EST HI 40 MIN: CPT | Performed by: PHYSICIAN ASSISTANT

## 2022-03-02 RX ORDER — AMLODIPINE BESYLATE 5 MG/1
5 TABLET ORAL DAILY
Qty: 90 TABLET | Refills: 3 | Status: SHIPPED | OUTPATIENT
Start: 2022-03-02 | End: 2023-03-13

## 2022-03-02 RX ORDER — LISINOPRIL 20 MG/1
20 TABLET ORAL 2 TIMES DAILY
Qty: 180 TABLET | Refills: 3 | Status: SHIPPED | OUTPATIENT
Start: 2022-03-02 | End: 2022-04-18

## 2022-03-02 NOTE — PROGRESS NOTES
Primary Cardiologist: Dr. Wylie    Reason for Visit: HTN management    History of Present Illness:   Stepan is a pleasant 51 year old male with past medical history notable for:     # CAD (hx of NSTEMI in 10/2020 with small-vessel OM disease- medical management was recommended)  # Resistant hypertension   # Hyperlipidemia  (at goal on rosuvastatin 40 mg)    Stepan's blood pressure unfortunately continues to be high.  He denies any herbal supplements or energy drinks or high amounts of caffeine intake.  He does have family history of hypertension.  During last visit we doubled his lisinopril and set him up for secondary hypertension lab work.  All of his labs studies returned normal.  He has not completed a renal ultrasound.  He denies chest discomfort, palpitations, peripheral edema, orthopnea, or bleeding issues.  He does get headaches occasionally.  He admits he consumes high amounts of salt.  He tries to be active and plays hockey.  He is aware that he is under a great deal of stress.  He is not sure if he snores at night or has apneic episodes.  He has never had a sleep study before.    Assessment and Plan:  Stepan is a pleasant 51 year old male with past medical history notable for:     # CAD (hx of NSTEMI in 10/2020 with small-vessel OM disease- medical management was recommended)  # Resistant hypertension (recent secondary hypertension lab work normal)  # Hyperlipidemia  (at goal on rosuvastatin 40 mg)    We will further titrate his antihypertensive medications to reach a more optimal blood pressure.  We will increase his lisinopril to 20 mg twice daily and have him start taking amlodipine 5 mg daily.  I will also have him complete a renal ultrasound to rule out renal artery stenosis.  Of note, his blood pressure is elevated on both arms.  It is unclear why his blood pressure is significantly elevated now.  We will continue to adjust medications and follow-up routinely.  I have asked him to return in 3 to 4 weeks with  a repeat BMP.    40  minutes spent on the date of the encounter with chart review, patient visit, care coordination, and documentation.      This note was completed in part using Dragon voice recognition software. Although reviewed after completion, some word and grammatical errors may occur.    Orders this Visit:  Orders Placed This Encounter   Procedures     US Renal Complete w Doppler Complete     Orders Placed This Encounter   Medications     lisinopril (ZESTRIL) 20 MG tablet     Sig: Take 1 tablet (20 mg) by mouth 2 times daily     Dispense:  180 tablet     Refill:  3     amLODIPine (NORVASC) 5 MG tablet     Sig: Take 1 tablet (5 mg) by mouth daily     Dispense:  90 tablet     Refill:  3     There are no discontinued medications.      Encounter Diagnoses   Name Primary?     Hyperlipidemia LDL goal <70      Essential hypertension Yes     Coronary artery disease involving native heart without angina pectoris, unspecified vessel or lesion type        CURRENT MEDICATIONS:  Current Outpatient Medications   Medication Sig Dispense Refill     amLODIPine (NORVASC) 5 MG tablet Take 1 tablet (5 mg) by mouth daily 90 tablet 3     aspirin (ASA) 81 MG EC tablet Take 1 tablet (81 mg) by mouth daily 30 tablet 0     Fexofenadine HCl (ALLEGRA PO) Take 180 mg by mouth At Bedtime        FLONASE INHA 50 MCG/DOSE NA Spray 1 spray in nostril At Bedtime        ketotifen (ZADITOR/REFRESH ANTI-ITCH) 0.025 % ophthalmic solution Place 1 drop into both eyes At Bedtime       lisinopril (ZESTRIL) 20 MG tablet Take 1 tablet (20 mg) by mouth 2 times daily 180 tablet 3     lisinopril (ZESTRIL) 20 MG tablet Take 1 tablet (20 mg) by mouth daily 90 tablet 3     metoprolol succinate ER (TOPROL-XL) 25 MG 24 hr tablet Take 1 tablet (25 mg) by mouth daily 90 tablet 2     nitroGLYcerin (NITROSTAT) 0.4 MG sublingual tablet For chest pain place 1 tablet under the tongue every 5 minutes for 3 doses. If symptoms persist 5 minutes after 1st dose call 911.  20 tablet 0     rosuvastatin (CRESTOR) 40 MG tablet Take 1 tablet (40 mg) by mouth daily 90 tablet 3       ALLERGIES   No Known Allergies    PAST MEDICAL HISTORY:  Past Medical History:   Diagnosis Date     Benign essential hypertension 04/10/2020     Coronary artery disease involving native coronary artery of native heart without angina pectoris     NSTEMI 1/2020; cardiac cath=medical management     Hyperlipidemia LDL goal <70 04/10/2020       PAST SURGICAL HISTORY:  Past Surgical History:   Procedure Laterality Date     CV CORONARY ANGIOGRAM N/A 1/10/2020    Procedure: Coronary Angiogram;  Surgeon: Nico Woodard MD;  Location:  [facility]     CV INSTANTANEOUS WAVE-FREE RATIO N/A 1/10/2020    Procedure: Instantaneous Wave-Free Ratio;  Surgeon: Nico Woodard MD;  Location:  HE[facility] CARD[facility] CATH LAB     CV LEFT HEART CATH N/A 1/10/2020    Procedure: Left Heart Cath;  Surgeon: Nico Woodard MD;  Location: [facility] CARDIAC CATH LAB     CV LEFT VENTRICULOGRAM N/A 1/10/2020    Procedure: Left Ventriculogram;  Surgeon: Nico Woodard MD;  Location: [facility] CARDIAC CATH LAB     ENT SURGERY      abcess in jaw and teeth pulled     HERNIORRHAPHY UMBILICAL N/A 10/12/2016    Procedure: HERNIORRHAPHY UMBILICAL;  Surgeon: Parker Alaniz MD;  Location: [facility] OR       FAMILY HISTORY:  Family History   Problem Relation Age of Onset     Thyroid Disease Sister      Heart Disease Maternal Grandmother        SOCIAL HISTORY:  Social History     Socioeconomic History     Marital status:      Spouse name: None     Number of children: None     Years of education: None     Highest education level: None   Occupational History     None   Tobacco Use     Smoking status: Never Smoker     Smokeless tobacco: Never Used   Substance and Sexual Activity     Alcohol use: Yes     Comment: occ     Drug use: No     Sexual activity: None   Other Topics Concern     Parent/sibling w/ CABG, MI or  "angioplasty before 65F 55M? Not Asked   Social History Narrative     None     Social Determinants of Health     Financial Resource Strain: Not on file   Food Insecurity: Not on file   Transportation Needs: Not on file   Physical Activity: Not on file   Stress: Not on file   Social Connections: Not on file   Intimate Partner Violence: Not on file   Housing Stability: Not on file       Review of Systems:  Skin:  Negative     Eyes:  Positive for contacts  ENT:  Positive for tinnitus  Respiratory:  Positive for cough  Cardiovascular:    fatigue;Positive for  Gastroenterology: Positive for nausea  Genitourinary:  Negative    Musculoskeletal:  Negative    Neurologic:  Positive for numbness or tingling of hands  Psychiatric:  Negative    Heme/Lymph/Imm:  Positive for allergies;hay fever  Endocrine:  Negative      Physical Exam:  Vitals: BP (!) 156/104   Pulse 87   Ht 1.727 m (5' 8\")   Wt 93.9 kg (207 lb)   SpO2 99%   BMI 31.47 kg/m       GEN:  NAD  NECK: No JVD  C/V:  Regular rate and rhythm, no murmur, rub or gallop.  RESP: Clear to auscultation bilaterally without wheezing, rales, or rhonchi.  GI: Abdomen soft, nontender, nondistended. No HSM appreciated.   EXTREM: No pitting LE edema.   NEURO: Alert and oriented, cooperative. No obvious focal deficits.   PSYCH: Normal affect.  SKIN: Warm and dry.       Recent Lab Results:  LIPID RESULTS:  Lab Results   Component Value Date    CHOL 140 07/14/2021    CHOL 155 04/21/2020    HDL 45 07/14/2021    HDL 46 04/21/2020    LDL 70 07/14/2021    LDL 62 04/21/2020    TRIG 126 07/14/2021    TRIG 233 (H) 04/21/2020       LIVER ENZYME RESULTS:  Lab Results   Component Value Date    AST 36 01/10/2020    ALT 26 04/21/2020       CBC RESULTS:  Lab Results   Component Value Date    WBC 10.0 01/10/2020    RBC 4.65 01/10/2020    HGB 13.9 01/10/2020    HCT 41.6 01/10/2020    MCV 90 01/10/2020    MCH 29.9 01/10/2020    MCHC 33.4 01/10/2020    RDW 13.2 01/10/2020     01/10/2020 "       BMP RESULTS:  Lab Results   Component Value Date     02/14/2022     01/22/2020    POTASSIUM 4.0 02/14/2022    POTASSIUM 3.7 01/22/2020    CHLORIDE 108 02/14/2022    CHLORIDE 106 01/22/2020    CO2 30 02/14/2022    CO2 29 01/22/2020    ANIONGAP 2 (L) 02/14/2022    ANIONGAP 10.7 01/22/2020     (H) 02/14/2022    GLC 95 01/22/2020    BUN 16 02/14/2022    BUN 14 01/22/2020    CR 1.22 02/14/2022    CR 1.15 01/22/2020    GFRESTIMATED 72 02/14/2022    GFRESTIMATED 68 01/22/2020    GFRESTBLACK 82 01/22/2020    NANDINI 9.0 02/14/2022    NANDINI 9.3 01/22/2020        A1C RESULTS:  Lab Results   Component Value Date    A1C 5.9 (H) 01/10/2020       INR RESULTS:  No results found for: INR        Marlene Hart PA-C  March 2, 2022

## 2022-03-02 NOTE — LETTER
Date:March 4, 2022      Provider requested that no letter be sent. Do not send.       [facility]

## 2022-03-02 NOTE — PATIENT INSTRUCTIONS
Today's Plan:   1) Start amlodipine- take one tablet (5 mg) once every morning.   2) Increase lisinopril to one pill (20 mg) two times daily.   3) Schedule kidney ultrasound to make sure your kidney arteries are not narrowed which can cause persistently high blood pressure.   4) Stay less than 2000 mg in a day for sodium/salt intake.   5) We may consider sleep study in the future as untreated sleep apnea can raise blood pressure.       If you have questions or concerns please call my nurse team at [phone].     Scheduling phone number: [phone].  Reminder: Please bring in all current medications, over the counter supplements and vitamin bottles to your next appointment.    It was a pleasure seeing you today!     Marlene Hart PA-C  3/2/2022

## 2022-03-02 NOTE — LETTER
3/2/2022    Physician No Ref-Primary  No address on file    RE: Stepan Pablo       Dear Colleague,     I had the pleasure of seeing Stepan Pablo in the [facility].  Primary Cardiologist: Dr. Wylie    Reason for Visit: HTN management    History of Present Illness:   Stepan is a pleasant 51 year old male with past medical history notable for:     # CAD (hx of NSTEMI in 10/2020 with small-vessel OM disease- medical management was recommended)  # Resistant hypertension   # Hyperlipidemia  (at goal on rosuvastatin 40 mg)    Stepan's blood pressure unfortunately continues to be high.  He denies any herbal supplements or energy drinks or high amounts of caffeine intake.  He does have family history of hypertension.  During last visit we doubled his lisinopril and set him up for secondary hypertension lab work.  All of his labs studies returned normal.  He has not completed a renal ultrasound.  He denies chest discomfort, palpitations, peripheral edema, orthopnea, or bleeding issues.  He does get headaches occasionally.  He admits he consumes high amounts of salt.  He tries to be active and plays hockey.  He is aware that he is under a great deal of stress.  He is not sure if he snores at night or has apneic episodes.  He has never had a sleep study before.    Assessment and Plan:  Stepan is a pleasant 51 year old male with past medical history notable for:     # CAD (hx of NSTEMI in 10/2020 with small-vessel OM disease- medical management was recommended)  # Resistant hypertension (recent secondary hypertension lab work normal)  # Hyperlipidemia  (at goal on rosuvastatin 40 mg)    We will further titrate his antihypertensive medications to reach a more optimal blood pressure.  We will increase his lisinopril to 20 mg twice daily and have him start taking amlodipine 5 mg daily.  I will also have him complete a renal ultrasound to rule out renal artery stenosis.  Of note, his blood pressure is elevated on both  arms.  It is unclear why his blood pressure is significantly elevated now.  We will continue to adjust medications and follow-up routinely.  I have asked him to return in 3 to 4 weeks with a repeat BMP.    40  minutes spent on the date of the encounter with chart review, patient visit, care coordination, and documentation.      This note was completed in part using Dragon voice recognition software. Although reviewed after completion, some word and grammatical errors may occur.    Orders this Visit:  Orders Placed This Encounter   Procedures     US Renal Complete w Doppler Complete     Orders Placed This Encounter   Medications     lisinopril (ZESTRIL) 20 MG tablet     Sig: Take 1 tablet (20 mg) by mouth 2 times daily     Dispense:  180 tablet     Refill:  3     amLODIPine (NORVASC) 5 MG tablet     Sig: Take 1 tablet (5 mg) by mouth daily     Dispense:  90 tablet     Refill:  3     There are no discontinued medications.      Encounter Diagnoses   Name Primary?     Hyperlipidemia LDL goal <70      Essential hypertension Yes     Coronary artery disease involving native heart without angina pectoris, unspecified vessel or lesion type        CURRENT MEDICATIONS:  Current Outpatient Medications   Medication Sig Dispense Refill     amLODIPine (NORVASC) 5 MG tablet Take 1 tablet (5 mg) by mouth daily 90 tablet 3     aspirin (ASA) 81 MG EC tablet Take 1 tablet (81 mg) by mouth daily 30 tablet 0     Fexofenadine HCl (ALLEGRA PO) Take 180 mg by mouth At Bedtime        FLONASE INHA 50 MCG/DOSE NA Spray 1 spray in nostril At Bedtime        ketotifen (ZADITOR/REFRESH ANTI-ITCH) 0.025 % ophthalmic solution Place 1 drop into both eyes At Bedtime       lisinopril (ZESTRIL) 20 MG tablet Take 1 tablet (20 mg) by mouth 2 times daily 180 tablet 3     lisinopril (ZESTRIL) 20 MG tablet Take 1 tablet (20 mg) by mouth daily 90 tablet 3     metoprolol succinate ER (TOPROL-XL) 25 MG 24 hr tablet Take 1 tablet (25 mg) by mouth daily 90 tablet  2     nitroGLYcerin (NITROSTAT) 0.4 MG sublingual tablet For chest pain place 1 tablet under the tongue every 5 minutes for 3 doses. If symptoms persist 5 minutes after 1st dose call 911. 20 tablet 0     rosuvastatin (CRESTOR) 40 MG tablet Take 1 tablet (40 mg) by mouth daily 90 tablet 3       ALLERGIES   No Known Allergies    PAST MEDICAL HISTORY:  Past Medical History:   Diagnosis Date     Benign essential hypertension 04/10/2020     Coronary artery disease involving native coronary artery of native heart without angina pectoris     NSTEMI 1/2020; cardiac cath=medical management     Hyperlipidemia LDL goal <70 04/10/2020       PAST SURGICAL HISTORY:  Past Surgical History:   Procedure Laterality Date     CV CORONARY ANGIOGRAM N/A 1/10/2020    Procedure: Coronary Angiogram;  Surgeon: Nico Woodard MD;  Location: [facility] CARDIAC CATH LAB     CV INSTANTANEOUS WAVE-FREE RATIO N/A 1/10/2020    Procedure: Instantaneous Wave-Free Ratio;  Surgeon: Nico Woodard MD;  Location: [facility] CARDIAC CATH LAB     CV LEFT HEART CATH N/A 1/10/2020    Procedure: Left Heart Cath;  Surgeon: Nico Woodard MD;  Location: [facility] CARDIAC CATH LAB     CV LEFT VENTRICULOGRAM N/A 1/10/2020    Procedure: Left Ventriculogram;  Surgeon: Nico Woodard MD;  Location:  HEART CARDIAC CATH LAB     ENT SURGERY      abcess in jaw and teeth pulled     HERNIORRHAPHY UMBILICAL N/A 10/12/2016    Procedure: HERNIORRHAPHY UMBILICAL;  Surgeon: Parker Alaniz MD;  Location: [facility] OR       FAMILY HISTORY:  Family History   Problem Relation Age of Onset     Thyroid Disease Sister      Heart Disease Maternal Grandmother        SOCIAL HISTORY:  Social History     Socioeconomic History     Marital status:      Spouse name: None     Number of children: None     Years of education: None     Highest education level: None   Occupational History     None   Tobacco Use     Smoking status: Never Smoker     Smokeless tobacco:  "Never Used   Substance and Sexual Activity     Alcohol use: Yes     Comment: occ     Drug use: No     Sexual activity: None   Other Topics Concern     Parent/sibling w/ CABG, MI or angioplasty before 65F 55M? Not Asked   Social History Narrative     None     Social Determinants of Health     Financial Resource Strain: Not on file   Food Insecurity: Not on file   Transportation Needs: Not on file   Physical Activity: Not on file   Stress: Not on file   Social Connections: Not on file   Intimate Partner Violence: Not on file   Housing Stability: Not on file       Review of Systems:  Skin:  Negative     Eyes:  Positive for contacts  ENT:  Positive for tinnitus  Respiratory:  Positive for cough  Cardiovascular:    fatigue;Positive for  Gastroenterology: Positive for nausea  Genitourinary:  Negative    Musculoskeletal:  Negative    Neurologic:  Positive for numbness or tingling of hands  Psychiatric:  Negative    Heme/Lymph/Imm:  Positive for allergies;hay fever  Endocrine:  Negative      Physical Exam:  Vitals: BP (!) 156/104   Pulse 87   Ht 1.727 m (5' 8\")   Wt 93.9 kg (207 lb)   SpO2 99%   BMI 31.47 kg/m       GEN:  NAD  NECK: No JVD  C/V:  Regular rate and rhythm, no murmur, rub or gallop.  RESP: Clear to auscultation bilaterally without wheezing, rales, or rhonchi.  GI: Abdomen soft, nontender, nondistended. No HSM appreciated.   EXTREM: No pitting LE edema.   NEURO: Alert and oriented, cooperative. No obvious focal deficits.   PSYCH: Normal affect.  SKIN: Warm and dry.       Recent Lab Results:  LIPID RESULTS:  Lab Results   Component Value Date    CHOL 140 07/14/2021    CHOL 155 04/21/2020    HDL 45 07/14/2021    HDL 46 04/21/2020    LDL 70 07/14/2021    LDL 62 04/21/2020    TRIG 126 07/14/2021    TRIG 233 (H) 04/21/2020       LIVER ENZYME RESULTS:  Lab Results   Component Value Date    AST 36 01/10/2020    ALT 26 04/21/2020       CBC RESULTS:  Lab Results   Component Value Date    WBC 10.0 01/10/2020    RBC " 4.65 01/10/2020    HGB 13.9 01/10/2020    HCT 41.6 01/10/2020    MCV 90 01/10/2020    MCH 29.9 01/10/2020    MCHC 33.4 01/10/2020    RDW 13.2 01/10/2020     01/10/2020       BMP RESULTS:  Lab Results   Component Value Date     02/14/2022     01/22/2020    POTASSIUM 4.0 02/14/2022    POTASSIUM 3.7 01/22/2020    CHLORIDE 108 02/14/2022    CHLORIDE 106 01/22/2020    CO2 30 02/14/2022    CO2 29 01/22/2020    ANIONGAP 2 (L) 02/14/2022    ANIONGAP 10.7 01/22/2020     (H) 02/14/2022    GLC 95 01/22/2020    BUN 16 02/14/2022    BUN 14 01/22/2020    CR 1.22 02/14/2022    CR 1.15 01/22/2020    GFRESTIMATED 72 02/14/2022    GFRESTIMATED 68 01/22/2020    GFRESTBLACK 82 01/22/2020    NANDINI 9.0 02/14/2022    NANDINI 9.3 01/22/2020        A1C RESULTS:  Lab Results   Component Value Date    A1C 5.9 (H) 01/10/2020       INR RESULTS:  No results found for: INR        Marlene Hart PA-C  March 2, 2022         Thank you for allowing me to participate in the care of your patient.      Sincerely,     Marlene Hart PA-C     [facility]  cc:   Marlene Hart PA-C  [address] [place] [address]

## 2022-03-15 ENCOUNTER — HOSPITAL ENCOUNTER (OUTPATIENT)
Dept: ULTRASOUND IMAGING | Facility: CLINIC | Age: 52
Discharge: HOME | End: 2022-03-15
Attending: PHYSICIAN ASSISTANT | Admitting: PHYSICIAN ASSISTANT
Payer: COMMERCIAL

## 2022-03-15 DIAGNOSIS — I10 ESSENTIAL HYPERTENSION: ICD-10-CM

## 2022-03-15 DIAGNOSIS — E78.5 HYPERLIPIDEMIA LDL GOAL <70: ICD-10-CM

## 2022-03-15 DIAGNOSIS — I25.10 CORONARY ARTERY DISEASE INVOLVING NATIVE HEART WITHOUT ANGINA PECTORIS, UNSPECIFIED VESSEL OR LESION TYPE: ICD-10-CM

## 2022-03-15 PROCEDURE — 76770 US EXAM ABDO BACK WALL COMP: CPT | Mod: 26 | Performed by: INTERNAL MEDICINE

## 2022-03-15 PROCEDURE — 76770 US EXAM ABDO BACK WALL COMP: CPT

## 2022-03-15 PROCEDURE — 93975 VASCULAR STUDY: CPT | Mod: 26 | Performed by: INTERNAL MEDICINE

## 2022-04-04 ENCOUNTER — MYC MEDICAL ADVICE (OUTPATIENT)
Dept: CARDIOLOGY | Facility: CLINIC | Age: 52
End: 2022-04-04
Payer: COMMERCIAL

## 2022-04-04 NOTE — TELEPHONE ENCOUNTER
"My chart message from patient:  Stepan Pablo JONATAN HENDERSON [facility] Heart Team 2  I can't remember if I am supposed to fast before my lab visit on Wednesday afternoon       If that info is on [employer] anywhere, I can't find it.   I found a note that says, \"Don't eat... if you are coming in fasting for labs on lipids, cholesterol, or glucose (sugar).\"  The lab entry itself doesn't tell me if we're checking on those things and I can't remember.     Thanks!   -Stepan      Reply to patient:    Sowmya, Mr. Pablo,    Your lab this week is a bmp (basic metabolic panel). It was ordered to check your electrolytes and kidney function. You do not have to fast for this labs.    The panel includes a glucose, so if you are curious, you can choose to fast to see what your level would be, but it is not necessary for the visit with ALBAN Marlene Hart.    Thank you for checking  Team 2 RNs  [phone]  "

## 2022-04-18 ENCOUNTER — TELEPHONE (OUTPATIENT)
Dept: CARDIOLOGY | Facility: CLINIC | Age: 52
End: 2022-04-18
Payer: COMMERCIAL

## 2022-04-18 DIAGNOSIS — I10 BENIGN ESSENTIAL HYPERTENSION: Primary | ICD-10-CM

## 2022-04-18 DIAGNOSIS — I10 ESSENTIAL HYPERTENSION: ICD-10-CM

## 2022-04-18 DIAGNOSIS — I25.10 CORONARY ARTERY DISEASE INVOLVING NATIVE HEART WITHOUT ANGINA PECTORIS, UNSPECIFIED VESSEL OR LESION TYPE: ICD-10-CM

## 2022-04-18 DIAGNOSIS — E78.5 HYPERLIPIDEMIA LDL GOAL <70: ICD-10-CM

## 2022-04-18 RX ORDER — LISINOPRIL 20 MG/1
20 TABLET ORAL 2 TIMES DAILY
Qty: 180 TABLET | Refills: 2 | Status: SHIPPED | OUTPATIENT
Start: 2022-04-18 | End: 2022-04-18

## 2022-04-18 RX ORDER — LISINOPRIL 40 MG/1
TABLET ORAL
Qty: 90 TABLET | Refills: 3 | Status: SHIPPED | OUTPATIENT
Start: 2022-04-18 | End: 2022-09-14

## 2022-04-18 NOTE — TELEPHONE ENCOUNTER
[facility] Call Center    Phone Message    May a detailed message be left on voicemail: yes     Reason for Call: Other: Pt needs a prior auth for lisinopril as pharmacy said with the med change the insurance won't cover, Please reach out o pt when done so he knows it is taken care of     Action Taken: Message routed to:  [facility] ([facility]): Cardio    Travel Screening: Not Applicable

## 2022-04-18 NOTE — TELEPHONE ENCOUNTER
To get insurance coverage pharmacy requested to send in Rx for 40 MG tablet to cut in half and take 20 MG BID.

## 2022-04-22 ENCOUNTER — LAB (OUTPATIENT)
Dept: LAB | Facility: CLINIC | Age: 52
End: 2022-04-22
Payer: COMMERCIAL

## 2022-04-22 ENCOUNTER — OFFICE VISIT (OUTPATIENT)
Dept: CARDIOLOGY | Facility: CLINIC | Age: 52
End: 2022-04-22
Payer: COMMERCIAL

## 2022-04-22 VITALS
HEIGHT: 68 IN | WEIGHT: 210.7 LBS | HEART RATE: 82 BPM | BODY MASS INDEX: 31.93 KG/M2 | SYSTOLIC BLOOD PRESSURE: 144 MMHG | OXYGEN SATURATION: 95 % | DIASTOLIC BLOOD PRESSURE: 86 MMHG

## 2022-04-22 DIAGNOSIS — I10 BENIGN ESSENTIAL HYPERTENSION: Primary | ICD-10-CM

## 2022-04-22 DIAGNOSIS — I25.10 CORONARY ARTERY DISEASE INVOLVING NATIVE HEART WITHOUT ANGINA PECTORIS, UNSPECIFIED VESSEL OR LESION TYPE: ICD-10-CM

## 2022-04-22 DIAGNOSIS — E78.5 HYPERLIPIDEMIA LDL GOAL <70: ICD-10-CM

## 2022-04-22 DIAGNOSIS — I10 ESSENTIAL HYPERTENSION: ICD-10-CM

## 2022-04-22 LAB
ANION GAP SERPL CALCULATED.3IONS-SCNC: 4 MMOL/L (ref 3–14)
BUN SERPL-MCNC: 17 MG/DL (ref 7–30)
CALCIUM SERPL-MCNC: 9.1 MG/DL (ref 8.5–10.1)
CHLORIDE BLD-SCNC: 108 MMOL/L (ref 94–109)
CO2 SERPL-SCNC: 31 MMOL/L (ref 20–32)
CREAT SERPL-MCNC: 1.06 MG/DL (ref 0.66–1.25)
GFR SERPL CREATININE-BSD FRML MDRD: 85 ML/MIN/1.73M2
GLUCOSE BLD-MCNC: 131 MG/DL (ref 70–99)
POTASSIUM BLD-SCNC: 4.1 MMOL/L (ref 3.4–5.3)
SODIUM SERPL-SCNC: 143 MMOL/L (ref 133–144)

## 2022-04-22 PROCEDURE — 36415 COLL VENOUS BLD VENIPUNCTURE: CPT | Performed by: PHYSICIAN ASSISTANT

## 2022-04-22 PROCEDURE — 99214 OFFICE O/P EST MOD 30 MIN: CPT | Performed by: PHYSICIAN ASSISTANT

## 2022-04-22 PROCEDURE — 80048 BASIC METABOLIC PNL TOTAL CA: CPT | Performed by: PHYSICIAN ASSISTANT

## 2022-04-22 RX ORDER — HYDROCHLOROTHIAZIDE 25 MG/1
25 TABLET ORAL DAILY
Qty: 90 TABLET | Refills: 3 | Status: SHIPPED | OUTPATIENT
Start: 2022-04-22 | End: 2023-04-25

## 2022-04-22 NOTE — PROGRESS NOTES
Primary Cardiologist: Dr. Wylie    Reason for Visit: HTN management    History of Present Illness:   Stepan is a pleasant 51 year old male with past medical history notable for:     # CAD (hx of NSTEMI in 10/2020 with small-vessel OM disease- medical management was recommended)  # Resistant hypertension (recent secondary hypertension lab work normal; renal US with no evidence of renal artery stenosis)  # Hyperlipidemia  (at goal on rosuvastatin 40 mg)    His BP continues to be high but he tells me he had a few numbers in the 120'130's for SBP. He has no lightheadedness since recent medication changes. He consumes a few alcoholic beverages several times a week while watching a game. He denies smoking. He has 1 cup of coffee every morning.      Assessment and Plan:  Stepan is a pleasant 51 year old male with past medical history notable for:     # CAD (hx of NSTEMI in 10/2020 with small-vessel OM disease- medical management was recommended)  # Resistant hypertension (recent secondary hypertension lab work normal; renal US with no evidence of renal artery stenosis)  # Hyperlipidemia  (at goal on rosuvastatin 40 mg)    He has no evidence of secondary hypertension. His BP continues to be high. He would benefit from thiazide diuretic. We will start him hydrochlorothiazide 25 mg daily. He will return to our clinic in a few weeks with repeat BMP. I have encouraged him to decrease his alcohol intake as this can contribute to his hypertension. I also encouraged him to find ways to reduce stress as he reports this may be contributing to his persistent hypertension.       This note was completed in part using Dragon voice recognition software. Although reviewed after completion, some word and grammatical errors may occur.    Orders this Visit:  No orders of the defined types were placed in this encounter.    Orders Placed This Encounter   Medications     hydrochlorothiazide (HYDRODIURIL) 25 MG tablet     Sig: Take 1 tablet (25 mg) by  mouth daily     Dispense:  90 tablet     Refill:  3     There are no discontinued medications.      Encounter Diagnoses   Name Primary?     Benign essential hypertension Yes     Hyperlipidemia LDL goal <70      Coronary artery disease involving native heart without angina pectoris, unspecified vessel or lesion type        CURRENT MEDICATIONS:  Current Outpatient Medications   Medication Sig Dispense Refill     amLODIPine (NORVASC) 5 MG tablet Take 1 tablet (5 mg) by mouth daily 90 tablet 3     aspirin (ASA) 81 MG EC tablet Take 1 tablet (81 mg) by mouth daily 30 tablet 0     Fexofenadine HCl (ALLEGRA PO) Take 180 mg by mouth At Bedtime        FLONASE INHA 50 MCG/DOSE NA Spray 1 spray in nostril At Bedtime        hydrochlorothiazide (HYDRODIURIL) 25 MG tablet Take 1 tablet (25 mg) by mouth daily 90 tablet 3     ketotifen (ZADITOR/REFRESH ANTI-ITCH) 0.025 % ophthalmic solution Place 1 drop into both eyes At Bedtime       lisinopril (ZESTRIL) 40 MG tablet Take 1/2 of a 40 MG tablet (20 MG) twice daily by mouth 90 tablet 3     metoprolol succinate ER (TOPROL-XL) 25 MG 24 hr tablet Take 1 tablet (25 mg) by mouth daily 90 tablet 2     nitroGLYcerin (NITROSTAT) 0.4 MG sublingual tablet For chest pain place 1 tablet under the tongue every 5 minutes for 3 doses. If symptoms persist 5 minutes after 1st dose call 911. 20 tablet 0     rosuvastatin (CRESTOR) 40 MG tablet Take 1 tablet (40 mg) by mouth daily 90 tablet 3       ALLERGIES   No Known Allergies    PAST MEDICAL HISTORY:  Past Medical History:   Diagnosis Date     Benign essential hypertension 04/10/2020     Coronary artery disease involving native coronary artery of native heart without angina pectoris     NSTEMI 1/2020; cardiac cath=medical management     Hyperlipidemia LDL goal <70 04/10/2020       PAST SURGICAL HISTORY:  Past Surgical History:   Procedure Laterality Date     CV CORONARY ANGIOGRAM N/A 1/10/2020    Procedure: Coronary Angiogram;  Surgeon: Vance  "Nico JONES MD;  Location:  [facility]     CV INSTANTANEOUS WAVE-FREE RATIO N/A 1/10/2020    Procedure: Instantaneous Wave-Free Ratio;  Surgeon: Nico Woodard MD;  Location:  [facility]     CV LEFT HEART CATH N/A 1/10/2020    Procedure: Left Heart Cath;  Surgeon: Nico Woodard MD;  Location:  [facility]     CV LEFT VENTRICULOGRAM N/A 1/10/2020    Procedure: Left Ventriculogram;  Surgeon: Nico Woodard MD;  Location:  [facility]     ENT SURGERY      abcess in jaw and teeth pulled     HERNIORRHAPHY UMBILICAL N/A 10/12/2016    Procedure: HERNIORRHAPHY UMBILICAL;  Surgeon: Parker Alaniz MD;  Location: [facility]       FAMILY HISTORY:  Family History   Problem Relation Age of Onset     Thyroid Disease Sister      Heart Disease Maternal Grandmother        SOCIAL HISTORY:  Social History     Socioeconomic History     Marital status:      Spouse name: None     Number of children: None     Years of education: None     Highest education level: None   Tobacco Use     Smoking status: Never Smoker     Smokeless tobacco: Never Used   Substance and Sexual Activity     Alcohol use: Yes     Comment: occ     Drug use: No       Review of Systems:  Skin:  Positive for lumps or bumps   Eyes:  Positive for contacts  ENT:  Positive for tinnitus  Respiratory:  Negative    Cardiovascular:  Negative    Gastroenterology: Negative    Genitourinary:  Positive for nocturia  Musculoskeletal:  Negative    Neurologic:  Negative    Psychiatric:  Negative    Heme/Lymph/Imm:  Positive for allergies;hay fever  Endocrine:  Negative      Physical Exam:  Vitals: BP (!) 144/86   Pulse 82   Ht 1.727 m (5' 8\")   Wt 95.6 kg (210 lb 11.2 oz)   SpO2 95%   BMI 32.04 kg/m       GEN:  NAD  NECK: No JVD  C/V:  Regular rate and rhythm, no murmur, rub or gallop.  RESP: Clear to auscultation bilaterally without wheezing, rales, or rhonchi.  GI: Abdomen soft, nontender, " nondistended.   EXTREM: No pitting LE edema.   NEURO: Alert and oriented, cooperative. No obvious focal deficits.   PSYCH: Normal affect.  SKIN: Warm and dry.       Recent Lab Results:  LIPID RESULTS:  Lab Results   Component Value Date    CHOL 140 07/14/2021    CHOL 155 04/21/2020    HDL 45 07/14/2021    HDL 46 04/21/2020    LDL 70 07/14/2021    LDL 62 04/21/2020    TRIG 126 07/14/2021    TRIG 233 (H) 04/21/2020       LIVER ENZYME RESULTS:  Lab Results   Component Value Date    AST 36 01/10/2020    ALT 26 04/21/2020       CBC RESULTS:  Lab Results   Component Value Date    WBC 10.0 01/10/2020    RBC 4.65 01/10/2020    HGB 13.9 01/10/2020    HCT 41.6 01/10/2020    MCV 90 01/10/2020    MCH 29.9 01/10/2020    MCHC 33.4 01/10/2020    RDW 13.2 01/10/2020     01/10/2020       BMP RESULTS:  Lab Results   Component Value Date     04/22/2022     01/22/2020    POTASSIUM 4.1 04/22/2022    POTASSIUM 3.7 01/22/2020    CHLORIDE 108 04/22/2022    CHLORIDE 106 01/22/2020    CO2 31 04/22/2022    CO2 29 01/22/2020    ANIONGAP 4 04/22/2022    ANIONGAP 10.7 01/22/2020     (H) 04/22/2022    GLC 95 01/22/2020    BUN 17 04/22/2022    BUN 14 01/22/2020    CR 1.06 04/22/2022    CR 1.15 01/22/2020    GFRESTIMATED 85 04/22/2022    GFRESTIMATED 68 01/22/2020    GFRESTBLACK 82 01/22/2020    NANDINI 9.1 04/22/2022    NANDINI 9.3 01/22/2020        A1C RESULTS:  Lab Results   Component Value Date    A1C 5.9 (H) 01/10/2020       INR RESULTS:  No results found for: INR        Marlene Hart PA-C  April 22, 2022

## 2022-04-22 NOTE — PATIENT INSTRUCTIONS
Today's Plan:   1) Start hydrochlorothiazide - take one tablet once every morning with breakfast.   2) Kidney ultrasound showed no narrowing in the kidney arteries.   3) Check blood pressure daily starting next week for 1 week. Check it once a day after sitting for a few minutes.   4) Returns in 3 weeks for follow up with repeat non-fasting blood work.     If you have questions or concerns please call my nurse team at [phone].     Scheduling phone number: [phone].  Reminder: Please bring in all current medications, over the counter supplements and vitamin bottles to your next appointment.    It was a pleasure seeing you today!     Marlene Hart PA-C  4/22/2022

## 2022-04-22 NOTE — LETTER
4/22/2022    Physician No Ref-Primary  No address on file    RE: Stepan Pablo       Dear Colleague,     I had the pleasure of seeing Stepan Pablo in the [facility].  Primary Cardiologist: Dr. Wylie    Reason for Visit: HTN management    History of Present Illness:   tSepan is a pleasant 51 year old male with past medical history notable for:     # CAD (hx of NSTEMI in 10/2020 with small-vessel OM disease- medical management was recommended)  # Resistant hypertension (recent secondary hypertension lab work normal; renal US with no evidence of renal artery stenosis)  # Hyperlipidemia  (at goal on rosuvastatin 40 mg)    His BP continues to be high but he tells me he had a few numbers in the 120'130's for SBP. He has no lightheadedness since recent medication changes. He consumes a few alcoholic beverages several times a week while watching a game. He denies smoking. He has 1 cup of coffee every morning.      Assessment and Plan:  Stepan is a pleasant 51 year old male with past medical history notable for:     # CAD (hx of NSTEMI in 10/2020 with small-vessel OM disease- medical management was recommended)  # Resistant hypertension (recent secondary hypertension lab work normal; renal US with no evidence of renal artery stenosis)  # Hyperlipidemia  (at goal on rosuvastatin 40 mg)    He has no evidence of secondary hypertension. His BP continues to be high. He would benefit from thiazide diuretic. We will start him hydrochlorothiazide 25 mg daily. He will return to our clinic in a few weeks with repeat BMP. I have encouraged him to decrease his alcohol intake as this can contribute to his hypertension. I also encouraged him to find ways to reduce stress as he reports this may be contributing to his persistent hypertension.       This note was completed in part using Dragon voice recognition software. Although reviewed after completion, some word and grammatical errors may occur.    Orders this Visit:  No  orders of the defined types were placed in this encounter.    Orders Placed This Encounter   Medications     hydrochlorothiazide (HYDRODIURIL) 25 MG tablet     Sig: Take 1 tablet (25 mg) by mouth daily     Dispense:  90 tablet     Refill:  3     There are no discontinued medications.      Encounter Diagnoses   Name Primary?     Benign essential hypertension Yes     Hyperlipidemia LDL goal <70      Coronary artery disease involving native heart without angina pectoris, unspecified vessel or lesion type        CURRENT MEDICATIONS:  Current Outpatient Medications   Medication Sig Dispense Refill     amLODIPine (NORVASC) 5 MG tablet Take 1 tablet (5 mg) by mouth daily 90 tablet 3     aspirin (ASA) 81 MG EC tablet Take 1 tablet (81 mg) by mouth daily 30 tablet 0     Fexofenadine HCl (ALLEGRA PO) Take 180 mg by mouth At Bedtime        FLONASE INHA 50 MCG/DOSE NA Spray 1 spray in nostril At Bedtime        hydrochlorothiazide (HYDRODIURIL) 25 MG tablet Take 1 tablet (25 mg) by mouth daily 90 tablet 3     ketotifen (ZADITOR/REFRESH ANTI-ITCH) 0.025 % ophthalmic solution Place 1 drop into both eyes At Bedtime       lisinopril (ZESTRIL) 40 MG tablet Take 1/2 of a 40 MG tablet (20 MG) twice daily by mouth 90 tablet 3     metoprolol succinate ER (TOPROL-XL) 25 MG 24 hr tablet Take 1 tablet (25 mg) by mouth daily 90 tablet 2     nitroGLYcerin (NITROSTAT) 0.4 MG sublingual tablet For chest pain place 1 tablet under the tongue every 5 minutes for 3 doses. If symptoms persist 5 minutes after 1st dose call 911. 20 tablet 0     rosuvastatin (CRESTOR) 40 MG tablet Take 1 tablet (40 mg) by mouth daily 90 tablet 3       ALLERGIES   No Known Allergies    PAST MEDICAL HISTORY:  Past Medical History:   Diagnosis Date     Benign essential hypertension 04/10/2020     Coronary artery disease involving native coronary artery of native heart without angina pectoris     NSTEMI 1/2020; cardiac cath=medical management     Hyperlipidemia LDL goal  "<70 04/10/2020       PAST SURGICAL HISTORY:  Past Surgical History:   Procedure Laterality Date     CV CORONARY ANGIOGRAM N/A 1/10/2020    Procedure: Coronary Angiogram;  Surgeon: Nico Woodard MD;  Location:  [facility]     CV INSTANTANEOUS WAVE-FREE RATIO N/A 1/10/2020    Procedure: Instantaneous Wave-Free Ratio;  Surgeon: Nico Woodard MD;  Location:  [facility]     CV LEFT HEART CATH N/A 1/10/2020    Procedure: Left Heart Cath;  Surgeon: Nico Woodard MD;  Location:  [facility]     CV LEFT VENTRICULOGRAM N/A 1/10/2020    Procedure: Left Ventriculogram;  Surgeon: Nico Woodard MD;  Location:  [facility]     ENT SURGERY      abcess in jaw and teeth pulled     HERNIORRHAPHY UMBILICAL N/A 10/12/2016    Procedure: HERNIORRHAPHY UMBILICAL;  Surgeon: Parker Alaniz MD;  Location: [facility]       FAMILY HISTORY:  Family History   Problem Relation Age of Onset     Thyroid Disease Sister      Heart Disease Maternal Grandmother        SOCIAL HISTORY:  Social History     Socioeconomic History     Marital status:      Spouse name: None     Number of children: None     Years of education: None     Highest education level: None   Tobacco Use     Smoking status: Never Smoker     Smokeless tobacco: Never Used   Substance and Sexual Activity     Alcohol use: Yes     Comment: occ     Drug use: No       Review of Systems:  Skin:  Positive for lumps or bumps   Eyes:  Positive for contacts  ENT:  Positive for tinnitus  Respiratory:  Negative    Cardiovascular:  Negative    Gastroenterology: Negative    Genitourinary:  Positive for nocturia  Musculoskeletal:  Negative    Neurologic:  Negative    Psychiatric:  Negative    Heme/Lymph/Imm:  Positive for allergies;hay fever  Endocrine:  Negative      Physical Exam:  Vitals: BP (!) 144/86   Pulse 82   Ht 1.727 m (5' 8\")   Wt 95.6 kg (210 lb 11.2 oz)   SpO2 95%   BMI 32.04 kg/m       GEN:  " NAD  NECK: No JVD  C/V:  Regular rate and rhythm, no murmur, rub or gallop.  RESP: Clear to auscultation bilaterally without wheezing, rales, or rhonchi.  GI: Abdomen soft, nontender, nondistended.   EXTREM: No pitting LE edema.   NEURO: Alert and oriented, cooperative. No obvious focal deficits.   PSYCH: Normal affect.  SKIN: Warm and dry.       Recent Lab Results:  LIPID RESULTS:  Lab Results   Component Value Date    CHOL 140 07/14/2021    CHOL 155 04/21/2020    HDL 45 07/14/2021    HDL 46 04/21/2020    LDL 70 07/14/2021    LDL 62 04/21/2020    TRIG 126 07/14/2021    TRIG 233 (H) 04/21/2020       LIVER ENZYME RESULTS:  Lab Results   Component Value Date    AST 36 01/10/2020    ALT 26 04/21/2020       CBC RESULTS:  Lab Results   Component Value Date    WBC 10.0 01/10/2020    RBC 4.65 01/10/2020    HGB 13.9 01/10/2020    HCT 41.6 01/10/2020    MCV 90 01/10/2020    MCH 29.9 01/10/2020    MCHC 33.4 01/10/2020    RDW 13.2 01/10/2020     01/10/2020       BMP RESULTS:  Lab Results   Component Value Date     04/22/2022     01/22/2020    POTASSIUM 4.1 04/22/2022    POTASSIUM 3.7 01/22/2020    CHLORIDE 108 04/22/2022    CHLORIDE 106 01/22/2020    CO2 31 04/22/2022    CO2 29 01/22/2020    ANIONGAP 4 04/22/2022    ANIONGAP 10.7 01/22/2020     (H) 04/22/2022    GLC 95 01/22/2020    BUN 17 04/22/2022    BUN 14 01/22/2020    CR 1.06 04/22/2022    CR 1.15 01/22/2020    GFRESTIMATED 85 04/22/2022    GFRESTIMATED 68 01/22/2020    GFRESTBLACK 82 01/22/2020    NANDINI 9.1 04/22/2022    NANDINI 9.3 01/22/2020        A1C RESULTS:  Lab Results   Component Value Date    A1C 5.9 (H) 01/10/2020       INR RESULTS:  No results found for: INR        Marlene Hart PA-C  April 22, 2022         Thank you for allowing me to participate in the care of your patient.      Sincerely,     Marlene Hart PA-C     [facility]  cc:   Referred Self, MD  No address on  file

## 2022-04-22 NOTE — LETTER
Date:April 23, 2022      Patient was self referred, no letter generated. Do not send.        [facility] Information

## 2022-05-01 ENCOUNTER — HEALTH MAINTENANCE LETTER (OUTPATIENT)
Age: 52
End: 2022-05-01

## 2022-06-29 DIAGNOSIS — I10 ESSENTIAL HYPERTENSION: ICD-10-CM

## 2022-06-29 DIAGNOSIS — I21.4 NSTEMI (NON-ST ELEVATED MYOCARDIAL INFARCTION) (H): ICD-10-CM

## 2022-06-29 RX ORDER — METOPROLOL SUCCINATE 25 MG/1
25 TABLET, EXTENDED RELEASE ORAL DAILY
Qty: 90 TABLET | Refills: 0 | Status: SHIPPED | OUTPATIENT
Start: 2022-06-29 | End: 2022-09-28

## 2022-07-15 ENCOUNTER — TRANSFERRED RECORDS (OUTPATIENT)
Dept: FAMILY MEDICINE | Facility: CLINIC | Age: 52
End: 2022-07-15

## 2022-09-14 ENCOUNTER — OFFICE VISIT (OUTPATIENT)
Dept: CARDIOLOGY | Facility: CLINIC | Age: 52
End: 2022-09-14
Payer: COMMERCIAL

## 2022-09-14 VITALS
DIASTOLIC BLOOD PRESSURE: 83 MMHG | HEART RATE: 81 BPM | BODY MASS INDEX: 32.54 KG/M2 | HEIGHT: 68 IN | SYSTOLIC BLOOD PRESSURE: 121 MMHG | WEIGHT: 214.7 LBS

## 2022-09-14 DIAGNOSIS — I21.4 NSTEMI (NON-ST ELEVATED MYOCARDIAL INFARCTION) (H): Primary | ICD-10-CM

## 2022-09-14 PROCEDURE — 99214 OFFICE O/P EST MOD 30 MIN: CPT | Performed by: INTERNAL MEDICINE

## 2022-09-14 RX ORDER — LISINOPRIL 40 MG/1
20 TABLET ORAL DAILY
COMMUNITY
End: 2022-11-10

## 2022-09-14 NOTE — LETTER
9/14/2022    Physician No Ref-Primary  No address on file    RE: Stepan Pablo       Dear Colleague,     I had the pleasure of seeing Stepan Pablo in the [facility].  HPI and Plan:   It is a pleasure for me to see Stepan who returns for follow-up of coronary artery disease and cardiac risk factors.    # CAD (hx of NSTEMI in 10/2020 with small-vessel OM disease- medical management was recommended)  # Resistant hypertension (recent secondary hypertension lab work normal; renal US with no evidence of renal artery stenosis)  # Hyperlipidemia  (at goal on rosuvastatin 40 mg)    In January 2020 he was ice-skating when he developed typical symptoms of left-sided chest discomfort with radiation to his left arm.  After 5 hours or so the symptoms he presented to our hospital and his peak troponin was 3.  EKG showed nonspecific ST segment changes.  In angiography the culprit artery was a small lateral branch of OM1.  It was not intervened upon.  There is mild nonobstructive disease elsewhere.  LVEF is normal overall though inferolateral wall hypokinesis was noted.    Is a pressure has been difficult to control in 1 time but is no longer an issue.  Essentially we increased his lisinopril dose and added hydrochlorothiazide and decreased his amlodipine dose from 10 to 5 mg.  This combination is well-tolerated without any side effects.  Blood pressure is excellent today in our offices and home numbers are essentially identical.    He has no cardiac symptoms and is physically active.  However he has not managed to lose significant amounts of weight and I urged him to consider portion control and gave him a target weight loss of at least 10 pounds prior to clinic visit next year.    Cardiac examination is normal.    Last year his lipid numbers were at target and he has not had them checked this year.  He is in the process of finding a primary care provider and I strongly recommended my good friend and former  colleague Mani Wade.  When he establishes care there he can have his fasting lipids drawn.    I look forward to seeing him again in 1 years time for continued follow-up.    No orders of the defined types were placed in this encounter.      Orders Placed This Encounter   Medications     lisinopril (ZESTRIL) 40 MG tablet     Sig: Take 20 mg by mouth daily       No diagnosis found.    CURRENT MEDICATIONS:  Current Outpatient Medications   Medication Sig Dispense Refill     amLODIPine (NORVASC) 5 MG tablet Take 1 tablet (5 mg) by mouth daily 90 tablet 3     aspirin (ASA) 81 MG EC tablet Take 1 tablet (81 mg) by mouth daily 30 tablet 0     Fexofenadine HCl (ALLEGRA PO) Take 180 mg by mouth At Bedtime        FLONASE INHA 50 MCG/DOSE NA Spray 1 spray in nostril At Bedtime        hydrochlorothiazide (HYDRODIURIL) 25 MG tablet Take 1 tablet (25 mg) by mouth daily 90 tablet 3     ketotifen (ZADITOR/REFRESH ANTI-ITCH) 0.025 % ophthalmic solution Place 1 drop into both eyes At Bedtime       lisinopril (ZESTRIL) 40 MG tablet Take 20 mg by mouth daily       metoprolol succinate ER (TOPROL XL) 25 MG 24 hr tablet Take 1 tablet (25 mg) by mouth daily Appointment required for further refills 90 tablet 0     nitroGLYcerin (NITROSTAT) 0.4 MG sublingual tablet For chest pain place 1 tablet under the tongue every 5 minutes for 3 doses. If symptoms persist 5 minutes after 1st dose call 911. 20 tablet 0     rosuvastatin (CRESTOR) 40 MG tablet Take 1 tablet (40 mg) by mouth daily 90 tablet 3       ALLERGIES   No Known Allergies    PAST MEDICAL HISTORY:  Past Medical History:   Diagnosis Date     Benign essential hypertension 04/10/2020     Coronary artery disease involving native coronary artery of native heart without angina pectoris     NSTEMI 1/2020; cardiac cath=medical management     Hyperlipidemia LDL goal <70 04/10/2020       PAST SURGICAL HISTORY:  Past Surgical History:   Procedure Laterality Date     CV CORONARY  "ANGIOGRAM N/A 1/10/2020    Procedure: Coronary Angiogram;  Surgeon: Nico Woodard MD;  Location:  HEART CARDIAC CATH LAB     CV INSTANTANEOUS WAVE-FREE RATIO N/A 1/10/2020    Procedure: Instantaneous Wave-Free Ratio;  Surgeon: Nico Woodard MD;  Location:  HEART CARDIAC CATH LAB     CV LEFT HEART CATH N/A 1/10/2020    Procedure: Left Heart Cath;  Surgeon: Nico Woodard MD;  Location:  HEART CARDIAC CATH LAB     CV LEFT VENTRICULOGRAM N/A 1/10/2020    Procedure: Left Ventriculogram;  Surgeon: Nico Woodard MD;  Location:  HEART CARDIAC CATH LAB     ENT SURGERY      abcess in jaw and teeth pulled     HERNIORRHAPHY UMBILICAL N/A 10/12/2016    Procedure: HERNIORRHAPHY UMBILICAL;  Surgeon: Parker Alaniz MD;  Location: [facility] OR       FAMILY HISTORY:  Family History   Problem Relation Age of Onset     Thyroid Disease Sister      Heart Disease Maternal Grandmother        SOCIAL HISTORY:  Social History     Socioeconomic History     Marital status:    Tobacco Use     Smoking status: Never Smoker     Smokeless tobacco: Never Used   Substance and Sexual Activity     Alcohol use: Yes     Comment: occ     Drug use: No       Review of Systems:  Skin:  not assessed     Eyes:  not assessed    ENT:  not assessed    Respiratory:  Negative    Cardiovascular:  Negative    Gastroenterology: not assessed    Genitourinary:  not assessed    Musculoskeletal:  not assessed    Neurologic:  not assessed    Psychiatric:  not assessed    Heme/Lymph/Imm:  not assessed    Endocrine:  Negative      Physical Exam:  Vitals: /83   Pulse 81   Ht 1.727 m (5' 8\")   Wt 97.4 kg (214 lb 11.2 oz)   BMI 32.65 kg/m      Constitutional:  cooperative, alert and oriented, well developed, well nourished, in no acute distress        Skin:  warm and dry to the touch, no apparent skin lesions or masses noted          Head:  normocephalic, no masses or lesions        Eyes:  pupils equal and round, " conjunctivae and lids unremarkable, sclera white, no xanthalasma, EOMS intact, no nystagmus        Lymph:No Cervical lymphadenopathy present     ENT:  no pallor or cyanosis, dentition good        Neck:  carotid pulses are full and equal bilaterally, JVP normal, no carotid bruit        Respiratory:  normal breath sounds, clear to auscultation, normal A-P diameter, normal symmetry, normal respiratory excursion, no use of accessory muscles         Cardiac: regular rhythm, normal S1/S2, no S3 or S4, apical impulse not displaced, no murmurs, gallops or rubs                pulses full and equal, no bruits auscultated                                        GI:  abdomen soft, non-tender, BS normoactive, no mass, no HSM, no bruits        Extremities and Muscular Skeletal:  no deformities, clubbing, cyanosis, erythema observed              Neurological:  no gross motor deficits        Psych:  Alert and Oriented x 3        Recent Lab Results:  LIPID RESULTS:  Lab Results   Component Value Date    CHOL 140 07/14/2021    CHOL 155 04/21/2020    HDL 45 07/14/2021    HDL 46 04/21/2020    LDL 70 07/14/2021    LDL 62 04/21/2020    TRIG 126 07/14/2021    TRIG 233 (H) 04/21/2020       LIVER ENZYME RESULTS:  Lab Results   Component Value Date    AST 36 01/10/2020    ALT 26 04/21/2020       CBC RESULTS:  Lab Results   Component Value Date    WBC 10.0 01/10/2020    RBC 4.65 01/10/2020    HGB 13.9 01/10/2020    HCT 41.6 01/10/2020    MCV 90 01/10/2020    MCH 29.9 01/10/2020    MCHC 33.4 01/10/2020    RDW 13.2 01/10/2020     01/10/2020       BMP RESULTS:  Lab Results   Component Value Date     04/22/2022     01/22/2020    POTASSIUM 4.1 04/22/2022    POTASSIUM 3.7 01/22/2020    CHLORIDE 108 04/22/2022    CHLORIDE 106 01/22/2020    CO2 31 04/22/2022    CO2 29 01/22/2020    ANIONGAP 4 04/22/2022    ANIONGAP 10.7 01/22/2020     (H) 04/22/2022    GLC 95 01/22/2020    BUN 17 04/22/2022    BUN 14 01/22/2020    CR 1.06  04/22/2022    CR 1.15 01/22/2020    GFRESTIMATED 85 04/22/2022    GFRESTIMATED 68 01/22/2020    GFRESTBLACK 82 01/22/2020    NANDINI 9.1 04/22/2022    NANDINI 9.3 01/22/2020        A1C RESULTS:  Lab Results   Component Value Date    A1C 5.9 (H) 01/10/2020       INR RESULTS:  No results found for: INR        CC  Referred Self, MD  No address on file                    Thank you for allowing me to participate in the care of your patient.      Sincerely,     DR TUTU MCCURDY MD     [facility]  cc:   Referred MD Gonzalo  No address on file

## 2022-09-14 NOTE — PROGRESS NOTES
HPI and Plan:   It is a pleasure for me to see Stepan who returns for follow-up of coronary artery disease and cardiac risk factors.    # CAD (hx of NSTEMI in 10/2020 with small-vessel OM disease- medical management was recommended)  # Resistant hypertension (recent secondary hypertension lab work normal; renal US with no evidence of renal artery stenosis)  # Hyperlipidemia  (at goal on rosuvastatin 40 mg)    In January 2020 he was ice-skating when he developed typical symptoms of left-sided chest discomfort with radiation to his left arm.  After 5 hours or so the symptoms he presented to our hospital and his peak troponin was 3.  EKG showed nonspecific ST segment changes.  In angiography the culprit artery was a small lateral branch of OM1.  It was not intervened upon.  There is mild nonobstructive disease elsewhere.  LVEF is normal overall though inferolateral wall hypokinesis was noted.    Is a pressure has been difficult to control in 1 time but is no longer an issue.  Essentially we increased his lisinopril dose and added hydrochlorothiazide and decreased his amlodipine dose from 10 to 5 mg.  This combination is well-tolerated without any side effects.  Blood pressure is excellent today in our offices and home numbers are essentially identical.    He has no cardiac symptoms and is physically active.  However he has not managed to lose significant amounts of weight and I urged him to consider portion control and gave him a target weight loss of at least 10 pounds prior to clinic visit next year.    Cardiac examination is normal.    Last year his lipid numbers were at target and he has not had them checked this year.  He is in the process of finding a primary care provider and I strongly recommended my good friend and former colleague Mani Wade.  When he establishes care there he can have his fasting lipids drawn.    I look forward to seeing him again in 1 years time for continued follow-up.    No  orders of the defined types were placed in this encounter.      Orders Placed This Encounter   Medications     lisinopril (ZESTRIL) 40 MG tablet     Sig: Take 20 mg by mouth daily       No diagnosis found.    CURRENT MEDICATIONS:  Current Outpatient Medications   Medication Sig Dispense Refill     amLODIPine (NORVASC) 5 MG tablet Take 1 tablet (5 mg) by mouth daily 90 tablet 3     aspirin (ASA) 81 MG EC tablet Take 1 tablet (81 mg) by mouth daily 30 tablet 0     Fexofenadine HCl (ALLEGRA PO) Take 180 mg by mouth At Bedtime        FLONASE INHA 50 MCG/DOSE NA Spray 1 spray in nostril At Bedtime        hydrochlorothiazide (HYDRODIURIL) 25 MG tablet Take 1 tablet (25 mg) by mouth daily 90 tablet 3     ketotifen (ZADITOR/REFRESH ANTI-ITCH) 0.025 % ophthalmic solution Place 1 drop into both eyes At Bedtime       lisinopril (ZESTRIL) 40 MG tablet Take 20 mg by mouth daily       metoprolol succinate ER (TOPROL XL) 25 MG 24 hr tablet Take 1 tablet (25 mg) by mouth daily Appointment required for further refills 90 tablet 0     nitroGLYcerin (NITROSTAT) 0.4 MG sublingual tablet For chest pain place 1 tablet under the tongue every 5 minutes for 3 doses. If symptoms persist 5 minutes after 1st dose call 911. 20 tablet 0     rosuvastatin (CRESTOR) 40 MG tablet Take 1 tablet (40 mg) by mouth daily 90 tablet 3       ALLERGIES   No Known Allergies    PAST MEDICAL HISTORY:  Past Medical History:   Diagnosis Date     Benign essential hypertension 04/10/2020     Coronary artery disease involving native coronary artery of native heart without angina pectoris     NSTEMI 1/2020; cardiac cath=medical management     Hyperlipidemia LDL goal <70 04/10/2020       PAST SURGICAL HISTORY:  Past Surgical History:   Procedure Laterality Date     CV CORONARY ANGIOGRAM N/A 1/10/2020    Procedure: Coronary Angiogram;  Surgeon: Nico Woodard MD;  Location: [facility] CARDIAC CATH LAB     CV INSTANTANEOUS WAVE-FREE RATIO N/A 1/10/2020    Procedure:  "Instantaneous Wave-Free Ratio;  Surgeon: Nico Woodard MD;  Location:  HEART CARDIAC CATH LAB     CV LEFT HEART CATH N/A 1/10/2020    Procedure: Left Heart Cath;  Surgeon: Nico Woodard MD;  Location:  HEART CARDIAC CATH LAB     CV LEFT VENTRICULOGRAM N/A 1/10/2020    Procedure: Left Ventriculogram;  Surgeon: Nico Woodard MD;  Location:  HEART CARDIAC CATH LAB     ENT SURGERY      abcess in jaw and teeth pulled     HERNIORRHAPHY UMBILICAL N/A 10/12/2016    Procedure: HERNIORRHAPHY UMBILICAL;  Surgeon: Parker Alaniz MD;  Location: [facility] OR       FAMILY HISTORY:  Family History   Problem Relation Age of Onset     Thyroid Disease Sister      Heart Disease Maternal Grandmother        SOCIAL HISTORY:  Social History     Socioeconomic History     Marital status:    Tobacco Use     Smoking status: Never Smoker     Smokeless tobacco: Never Used   Substance and Sexual Activity     Alcohol use: Yes     Comment: occ     Drug use: No       Review of Systems:  Skin:  not assessed     Eyes:  not assessed    ENT:  not assessed    Respiratory:  Negative    Cardiovascular:  Negative    Gastroenterology: not assessed    Genitourinary:  not assessed    Musculoskeletal:  not assessed    Neurologic:  not assessed    Psychiatric:  not assessed    Heme/Lymph/Imm:  not assessed    Endocrine:  Negative      Physical Exam:  Vitals: /83   Pulse 81   Ht 1.727 m (5' 8\")   Wt 97.4 kg (214 lb 11.2 oz)   BMI 32.65 kg/m      Constitutional:  cooperative, alert and oriented, well developed, well nourished, in no acute distress        Skin:  warm and dry to the touch, no apparent skin lesions or masses noted          Head:  normocephalic, no masses or lesions        Eyes:  pupils equal and round, conjunctivae and lids unremarkable, sclera white, no xanthalasma, EOMS intact, no nystagmus        Lymph:No Cervical lymphadenopathy present     ENT:  no pallor or cyanosis, dentition good        Neck:  " carotid pulses are full and equal bilaterally, JVP normal, no carotid bruit        Respiratory:  normal breath sounds, clear to auscultation, normal A-P diameter, normal symmetry, normal respiratory excursion, no use of accessory muscles         Cardiac: regular rhythm, normal S1/S2, no S3 or S4, apical impulse not displaced, no murmurs, gallops or rubs                pulses full and equal, no bruits auscultated                                        GI:  abdomen soft, non-tender, BS normoactive, no mass, no HSM, no bruits        Extremities and Muscular Skeletal:  no deformities, clubbing, cyanosis, erythema observed              Neurological:  no gross motor deficits        Psych:  Alert and Oriented x 3        Recent Lab Results:  LIPID RESULTS:  Lab Results   Component Value Date    CHOL 140 07/14/2021    CHOL 155 04/21/2020    HDL 45 07/14/2021    HDL 46 04/21/2020    LDL 70 07/14/2021    LDL 62 04/21/2020    TRIG 126 07/14/2021    TRIG 233 (H) 04/21/2020       LIVER ENZYME RESULTS:  Lab Results   Component Value Date    AST 36 01/10/2020    ALT 26 04/21/2020       CBC RESULTS:  Lab Results   Component Value Date    WBC 10.0 01/10/2020    RBC 4.65 01/10/2020    HGB 13.9 01/10/2020    HCT 41.6 01/10/2020    MCV 90 01/10/2020    MCH 29.9 01/10/2020    MCHC 33.4 01/10/2020    RDW 13.2 01/10/2020     01/10/2020       BMP RESULTS:  Lab Results   Component Value Date     04/22/2022     01/22/2020    POTASSIUM 4.1 04/22/2022    POTASSIUM 3.7 01/22/2020    CHLORIDE 108 04/22/2022    CHLORIDE 106 01/22/2020    CO2 31 04/22/2022    CO2 29 01/22/2020    ANIONGAP 4 04/22/2022    ANIONGAP 10.7 01/22/2020     (H) 04/22/2022    GLC 95 01/22/2020    BUN 17 04/22/2022    BUN 14 01/22/2020    CR 1.06 04/22/2022    CR 1.15 01/22/2020    GFRESTIMATED 85 04/22/2022    GFRESTIMATED 68 01/22/2020    GFRESTBLACK 82 01/22/2020    NANDINI 9.1 04/22/2022    NANDINI 9.3 01/22/2020        A1C RESULTS:  Lab Results    Component Value Date    A1C 5.9 (H) 01/10/2020       INR RESULTS:  No results found for: INR        CC  Referred Self, MD  No address on file

## 2022-09-14 NOTE — LETTER
Date:September 16, 2022      Patient was self referred, no letter generated. Do not send.        [facility] Health Information       Heart murmur

## 2022-09-27 DIAGNOSIS — E78.5 HYPERLIPIDEMIA LDL GOAL <70: ICD-10-CM

## 2022-09-27 DIAGNOSIS — I21.4 NSTEMI (NON-ST ELEVATED MYOCARDIAL INFARCTION) (H): ICD-10-CM

## 2022-09-27 DIAGNOSIS — I10 ESSENTIAL HYPERTENSION: ICD-10-CM

## 2022-09-27 RX ORDER — ROSUVASTATIN CALCIUM 40 MG/1
40 TABLET, COATED ORAL DAILY
Qty: 90 TABLET | Refills: 3 | Status: SHIPPED | OUTPATIENT
Start: 2022-09-27 | End: 2023-10-10

## 2022-09-27 NOTE — TELEPHONE ENCOUNTER
Received refill request for:  Atorvastatin    [facility] Cardiology Refill Guideline reviewed.  Medication meets criteria for refill.    Monique Castaneda RN, BSN  09/27/22 at 12:46 PM

## 2022-09-28 RX ORDER — METOPROLOL SUCCINATE 25 MG/1
25 TABLET, EXTENDED RELEASE ORAL DAILY
Qty: 90 TABLET | Refills: 3 | Status: SHIPPED | OUTPATIENT
Start: 2022-09-28 | End: 2023-10-16

## 2022-09-28 NOTE — TELEPHONE ENCOUNTER
Received refill request for:  Metoprolol.  [facility] Cardiology Refill Guideline reviewed.  Medication meets criteria for refill.  Betsy Murcia RN 09/28/22 11:05 AM

## 2022-11-10 DIAGNOSIS — I10 BENIGN ESSENTIAL HYPERTENSION: Primary | ICD-10-CM

## 2022-11-10 RX ORDER — LISINOPRIL 40 MG/1
20 TABLET ORAL DAILY
Qty: 45 TABLET | Refills: 2 | Status: SHIPPED | OUTPATIENT
Start: 2022-11-10 | End: 2023-10-24

## 2022-11-21 ENCOUNTER — HEALTH MAINTENANCE LETTER (OUTPATIENT)
Age: 52
End: 2022-11-21

## 2023-03-13 DIAGNOSIS — I10 ESSENTIAL HYPERTENSION: ICD-10-CM

## 2023-03-13 DIAGNOSIS — E78.5 HYPERLIPIDEMIA LDL GOAL <70: ICD-10-CM

## 2023-03-13 DIAGNOSIS — I25.10 CORONARY ARTERY DISEASE INVOLVING NATIVE HEART WITHOUT ANGINA PECTORIS, UNSPECIFIED VESSEL OR LESION TYPE: ICD-10-CM

## 2023-03-13 RX ORDER — AMLODIPINE BESYLATE 5 MG/1
5 TABLET ORAL DAILY
Qty: 90 TABLET | Refills: 3 | Status: SHIPPED | OUTPATIENT
Start: 2023-03-13 | End: 2024-03-01

## 2023-03-13 NOTE — TELEPHONE ENCOUNTER
Received MyChart message from patient that he is needing refill of Amlodipine 5mg tab.    Last OV  9\22\22  Dr. Wylie    [facility] Cardiology Refill Guideline reviewed.  Medication meets criteria for refill.     MyChart messaged to patient to let them know about refill.    Shanti Barth RN on 3/13/2023 at 8:55 AM

## 2023-04-25 DIAGNOSIS — E78.5 HYPERLIPIDEMIA LDL GOAL <70: ICD-10-CM

## 2023-04-25 DIAGNOSIS — I21.4 NSTEMI (NON-ST ELEVATED MYOCARDIAL INFARCTION) (H): Primary | ICD-10-CM

## 2023-04-25 DIAGNOSIS — I25.10 CORONARY ARTERY DISEASE INVOLVING NATIVE HEART WITHOUT ANGINA PECTORIS, UNSPECIFIED VESSEL OR LESION TYPE: ICD-10-CM

## 2023-04-25 DIAGNOSIS — I10 BENIGN ESSENTIAL HYPERTENSION: ICD-10-CM

## 2023-04-25 RX ORDER — HYDROCHLOROTHIAZIDE 25 MG/1
25 TABLET ORAL DAILY
Qty: 90 TABLET | Refills: 0 | Status: SHIPPED | OUTPATIENT
Start: 2023-04-25 | End: 2023-07-25

## 2023-04-25 NOTE — TELEPHONE ENCOUNTER
Team received request from patient to refill hydrochlorothiazide 25mg daily.      Last OV September of 2022 with Dr. Wylie.  Last lab work April of 2022.    Per Dr. Wylie's visit note, Stepan was supposed to establish care with a PCP.  Writer does not see that he has done this when looking in CareEverywhere.      Writer has sent Stepan a [employer] message that I have refilled this prescription, but only for 90 days, because he is overdue for labwork.  I have ensured there are proper lab orders entered in the chart, and requested if he would rather get them drawn at a clinic other than [facility], I need to know so I can fax orders.    Shanti Barth RN on 4/25/2023 at 9:07 AM

## 2023-06-02 ENCOUNTER — HEALTH MAINTENANCE LETTER (OUTPATIENT)
Age: 53
End: 2023-06-02

## 2023-07-25 DIAGNOSIS — I25.10 CORONARY ARTERY DISEASE INVOLVING NATIVE HEART WITHOUT ANGINA PECTORIS, UNSPECIFIED VESSEL OR LESION TYPE: ICD-10-CM

## 2023-07-25 DIAGNOSIS — I10 BENIGN ESSENTIAL HYPERTENSION: ICD-10-CM

## 2023-07-25 DIAGNOSIS — E78.5 HYPERLIPIDEMIA LDL GOAL <70: ICD-10-CM

## 2023-07-25 RX ORDER — HYDROCHLOROTHIAZIDE 25 MG/1
25 TABLET ORAL DAILY
Qty: 90 TABLET | Refills: 0 | Status: SHIPPED | OUTPATIENT
Start: 2023-07-25 | End: 2023-10-27

## 2023-07-31 ENCOUNTER — LAB (OUTPATIENT)
Dept: LAB | Facility: CLINIC | Age: 53
End: 2023-07-31
Payer: COMMERCIAL

## 2023-07-31 DIAGNOSIS — I25.10 CORONARY ARTERY DISEASE INVOLVING NATIVE HEART WITHOUT ANGINA PECTORIS, UNSPECIFIED VESSEL OR LESION TYPE: ICD-10-CM

## 2023-07-31 DIAGNOSIS — E78.5 HYPERLIPIDEMIA LDL GOAL <70: ICD-10-CM

## 2023-07-31 DIAGNOSIS — I21.4 NSTEMI (NON-ST ELEVATED MYOCARDIAL INFARCTION) (H): ICD-10-CM

## 2023-07-31 DIAGNOSIS — I10 BENIGN ESSENTIAL HYPERTENSION: ICD-10-CM

## 2023-07-31 LAB
ALT SERPL W P-5'-P-CCNC: 63 U/L (ref 0–70)
ANION GAP SERPL CALCULATED.3IONS-SCNC: 13 MMOL/L (ref 7–15)
BUN SERPL-MCNC: 18.6 MG/DL (ref 6–20)
CALCIUM SERPL-MCNC: 9.3 MG/DL (ref 8.6–10)
CHLORIDE SERPL-SCNC: 104 MMOL/L (ref 98–107)
CHOLEST SERPL-MCNC: 178 MG/DL
CREAT SERPL-MCNC: 1.07 MG/DL (ref 0.67–1.17)
DEPRECATED HCO3 PLAS-SCNC: 26 MMOL/L (ref 22–29)
GFR SERPL CREATININE-BSD FRML MDRD: 83 ML/MIN/1.73M2
GLUCOSE SERPL-MCNC: 105 MG/DL (ref 70–99)
HDLC SERPL-MCNC: 38 MG/DL
LDLC SERPL CALC-MCNC: 72 MG/DL
NONHDLC SERPL-MCNC: 140 MG/DL
POTASSIUM SERPL-SCNC: 3.6 MMOL/L (ref 3.4–5.3)
SODIUM SERPL-SCNC: 143 MMOL/L (ref 136–145)
TRIGL SERPL-MCNC: 342 MG/DL

## 2023-07-31 PROCEDURE — 84460 ALANINE AMINO (ALT) (SGPT): CPT | Performed by: INTERNAL MEDICINE

## 2023-07-31 PROCEDURE — 36415 COLL VENOUS BLD VENIPUNCTURE: CPT | Performed by: INTERNAL MEDICINE

## 2023-07-31 PROCEDURE — 80048 BASIC METABOLIC PNL TOTAL CA: CPT | Performed by: INTERNAL MEDICINE

## 2023-07-31 PROCEDURE — 80061 LIPID PANEL: CPT | Performed by: INTERNAL MEDICINE

## 2023-08-01 ENCOUNTER — OFFICE VISIT (OUTPATIENT)
Dept: CARDIOLOGY | Facility: CLINIC | Age: 53
End: 2023-08-01
Payer: COMMERCIAL

## 2023-08-01 VITALS
HEIGHT: 68 IN | WEIGHT: 214.1 LBS | DIASTOLIC BLOOD PRESSURE: 77 MMHG | HEART RATE: 79 BPM | SYSTOLIC BLOOD PRESSURE: 113 MMHG | OXYGEN SATURATION: 95 % | BODY MASS INDEX: 32.45 KG/M2

## 2023-08-01 DIAGNOSIS — E78.5 HYPERLIPIDEMIA LDL GOAL <70: ICD-10-CM

## 2023-08-01 DIAGNOSIS — I10 BENIGN ESSENTIAL HYPERTENSION: ICD-10-CM

## 2023-08-01 DIAGNOSIS — I21.4 NSTEMI (NON-ST ELEVATED MYOCARDIAL INFARCTION) (H): ICD-10-CM

## 2023-08-01 DIAGNOSIS — I25.10 CORONARY ARTERY DISEASE INVOLVING NATIVE HEART WITHOUT ANGINA PECTORIS, UNSPECIFIED VESSEL OR LESION TYPE: ICD-10-CM

## 2023-08-01 PROCEDURE — 99213 OFFICE O/P EST LOW 20 MIN: CPT | Performed by: INTERNAL MEDICINE

## 2023-08-01 NOTE — LETTER
8/1/2023    Physician No Ref-Primary  No address on file    RE: Stepan Pablo       Dear Colleague,     I had the pleasure of seeing Stepan Pablo in the [facility].  HPI and Plan:   It is a pleasure for me to see Stepan who returns for follow-up of coronary artery disease and cardiac risk factors.     # CAD (hx of NSTEMI in 10/2020 with small-vessel OM disease- medical management was recommended)  # Resistant hypertension (recent secondary hypertension lab work normal; renal US with no evidence of renal artery stenosis)  # Hyperlipidemia  (at goal on rosuvastatin 40 mg)     In January 2020 he was ice-skating when he developed typical symptoms of left-sided chest discomfort with radiation to his left arm.  After 5 hours or so the symptoms he presented to our hospital and his peak troponin was 3.  EKG showed nonspecific ST segment changes.  In angiography the culprit artery was a small lateral branch of OM1.  It was not intervened upon.  There is mild nonobstructive disease elsewhere.  LVEF is normal overall though inferolateral wall hypokinesis was noted.     His blood pressure has been difficult to control at one time but is no longer an issue.  Essentially we increased his lisinopril dose and added hydrochlorothiazide and decreased his amlodipine dose from 10 to 5 mg.  This combination is well-tolerated without any side effects.  Blood pressure is excellent today in our offices and home numbers are essentially identical.  He measures his blood pressure at home about once a month.    He continues to do well and is completely asymptomatic from the cardiac point of view.  In the winter he plays hockey with his son and in the summer he walks his dog for an hour almost every day.    Cardiac examination is normal.    Unfortunately body mass index has not changed much.    His LDL is fine but triglycerides are elevated to just above the 300s.  I do not think this needs pharmacologic treatment plan I  advised him to avoid fatty foods is much as possible.  He does like to a barbecue in his backyard.    Aside from the triglycerides I think he is doing just fine.  I will see him again in 1 years time for continued follow-up and we will check his lipids prior to our clinic attendance.        No orders of the defined types were placed in this encounter.      No orders of the defined types were placed in this encounter.      Encounter Diagnoses   Name Primary?    Benign essential hypertension     Hyperlipidemia LDL goal <70     Coronary artery disease involving native heart without angina pectoris, unspecified vessel or lesion type     NSTEMI (non-ST elevated myocardial infarction) (H)        CURRENT MEDICATIONS:  Current Outpatient Medications   Medication Sig Dispense Refill    amLODIPine (NORVASC) 5 MG tablet Take 1 tablet (5 mg) by mouth daily 90 tablet 3    aspirin (ASA) 81 MG EC tablet Take 1 tablet (81 mg) by mouth daily 30 tablet 0    Fexofenadine HCl (ALLEGRA PO) Take 180 mg by mouth At Bedtime       FLONASE INHA 50 MCG/DOSE NA Spray 1 spray in nostril At Bedtime       hydrochlorothiazide (HYDRODIURIL) 25 MG tablet Take 1 tablet (25 mg) by mouth daily 90 tablet 0    ketotifen (ZADITOR/REFRESH ANTI-ITCH) 0.025 % ophthalmic solution Place 1 drop into both eyes At Bedtime      lisinopril (ZESTRIL) 40 MG tablet Take 0.5 tablets (20 mg) by mouth daily 45 tablet 2    metoprolol succinate ER (TOPROL XL) 25 MG 24 hr tablet Take 1 tablet (25 mg) by mouth daily Appointment required for further refills 90 tablet 3    nitroGLYcerin (NITROSTAT) 0.4 MG sublingual tablet For chest pain place 1 tablet under the tongue every 5 minutes for 3 doses. If symptoms persist 5 minutes after 1st dose call 911. 20 tablet 0    rosuvastatin (CRESTOR) 40 MG tablet Take 1 tablet (40 mg) by mouth daily 90 tablet 3       ALLERGIES   No Known Allergies    PAST MEDICAL HISTORY:  Past Medical History:   Diagnosis Date    Benign essential  hypertension 04/10/2020    Coronary artery disease involving native coronary artery of native heart without angina pectoris     NSTEMI 1/2020; cardiac cath=medical management    Hyperlipidemia LDL goal <70 04/10/2020       PAST SURGICAL HISTORY:  Past Surgical History:   Procedure Laterality Date    CV CORONARY ANGIOGRAM N/A 1/10/2020    Procedure: Coronary Angiogram;  Surgeon: Nico Woodard MD;  Location:  [facility]    CV INSTANTANEOUS WAVE-FREE RATIO N/A 1/10/2020    Procedure: Instantaneous Wave-Free Ratio;  Surgeon: Nico Woodard MD;  Location:  [facility] CATH [facility]    CV LEFT HEART CATH N/A 1/10/2020    Procedure: Left Heart Cath;  Surgeon: Nico Woodard MD;  Location:  [facility]    CV LEFT VENTRICULOGRAM N/A 1/10/2020    Procedure: Left Ventriculogram;  Surgeon: Nico Woodard MD;  Location:  [facility] CARDI[facility] CATH LAB    ENT SURGERY      abcess in jaw and teeth pulled    HERNIORRHAPHY UMBILICAL N/A 10/12/2016    Procedure: HERNIORRHAPHY UMBILICAL;  Surgeon: Parker Alaniz MD;  Location: [facility] OR       FAMILY HISTORY:  Family History   Problem Relation Age of Onset    Thyroid Disease Sister     Heart Disease Maternal Grandmother        SOCIAL HISTORY:  Social History     Socioeconomic History    Marital status:      Spouse name: None    Number of children: None    Years of education: None    Highest education level: None   Tobacco Use    Smoking status: Never    Smokeless tobacco: Never   Substance and Sexual Activity    Alcohol use: Yes     Comment: occ    Drug use: No       Review of Systems:  Skin:  Negative bruising;itching   Eyes:       ENT:       Respiratory:  Negative shortness of breath;cough;wheezing;sleep apnea  Cardiovascular:  Negative;palpitations;chest pain;edema    Gastroenterology:      Genitourinary:       Musculoskeletal:       Neurologic:       Psychiatric:       Heme/Lymph/Imm:       Endocrine:         Physical  "Exam:  Vitals: /77   Pulse 79   Ht 1.727 m (5' 8\")   Wt 97.1 kg (214 lb 1.6 oz)   SpO2 95%   BMI 32.55 kg/m      Constitutional:           Skin:             Head:           Eyes:           Lymph:      ENT:           Neck:           Respiratory:            Cardiac:                                                           GI:           Extremities and Muscular Skeletal:                 Neurological:           Psych:           Recent Lab Results:  LIPID RESULTS:  Lab Results   Component Value Date    CHOL 178 07/31/2023    CHOL 155 04/21/2020    HDL 38 (L) 07/31/2023    HDL 46 04/21/2020    LDL 72 07/31/2023    LDL 62 04/21/2020    TRIG 342 (H) 07/31/2023    TRIG 233 (H) 04/21/2020       LIVER ENZYME RESULTS:  Lab Results   Component Value Date    AST 36 01/10/2020    ALT 63 07/31/2023    ALT 26 04/21/2020       CBC RESULTS:  Lab Results   Component Value Date    WBC 10.0 01/10/2020    RBC 4.65 01/10/2020    HGB 13.9 01/10/2020    HCT 41.6 01/10/2020    MCV 90 01/10/2020    MCH 29.9 01/10/2020    MCHC 33.4 01/10/2020    RDW 13.2 01/10/2020     01/10/2020       BMP RESULTS:  Lab Results   Component Value Date     07/31/2023     01/22/2020    POTASSIUM 3.6 07/31/2023    POTASSIUM 4.1 04/22/2022    POTASSIUM 3.7 01/22/2020    CHLORIDE 104 07/31/2023    CHLORIDE 108 04/22/2022    CHLORIDE 106 01/22/2020    CO2 26 07/31/2023    CO2 31 04/22/2022    CO2 29 01/22/2020    ANIONGAP 13 07/31/2023    ANIONGAP 4 04/22/2022    ANIONGAP 10.7 01/22/2020     (H) 07/31/2023     (H) 04/22/2022    GLC 95 01/22/2020    BUN 18.6 07/31/2023    BUN 17 04/22/2022    BUN 14 01/22/2020    CR 1.07 07/31/2023    CR 1.15 01/22/2020    GFRESTIMATED 83 07/31/2023    GFRESTIMATED 68 01/22/2020    GFRESTBLACK 82 01/22/2020    NANDINI 9.3 07/31/2023    NANDINI 9.3 01/22/2020        A1C RESULTS:  Lab Results   Component Value Date    A1C 5.9 (H) 01/10/2020       INR RESULTS:  No results found for: " INR        CC  Tutu Wylie MD  [id number]  [place] [address]      Thank you for allowing me to participate in the care of your patient.      Sincerely,     DR TUTU WYLIE MD     [facility]

## 2023-08-01 NOTE — PROGRESS NOTES
HPI and Plan:   It is a pleasure for me to see Stepan who returns for follow-up of coronary artery disease and cardiac risk factors.     # CAD (hx of NSTEMI in 10/2020 with small-vessel OM disease- medical management was recommended)  # Resistant hypertension (recent secondary hypertension lab work normal; renal US with no evidence of renal artery stenosis)  # Hyperlipidemia  (at goal on rosuvastatin 40 mg)     In January 2020 he was ice-skating when he developed typical symptoms of left-sided chest discomfort with radiation to his left arm.  After 5 hours or so the symptoms he presented to our hospital and his peak troponin was 3.  EKG showed nonspecific ST segment changes.  In angiography the culprit artery was a small lateral branch of OM1.  It was not intervened upon.  There is mild nonobstructive disease elsewhere.  LVEF is normal overall though inferolateral wall hypokinesis was noted.     His blood pressure has been difficult to control at one time but is no longer an issue.  Essentially we increased his lisinopril dose and added hydrochlorothiazide and decreased his amlodipine dose from 10 to 5 mg.  This combination is well-tolerated without any side effects.  Blood pressure is excellent today in our offices and home numbers are essentially identical.  He measures his blood pressure at home about once a month.    He continues to do well and is completely asymptomatic from the cardiac point of view.  In the winter he plays hockey with his son and in the summer he walks his dog for an hour almost every day.    Cardiac examination is normal.    Unfortunately body mass index has not changed much.    His LDL is fine but triglycerides are elevated to just above the 300s.  I do not think this needs pharmacologic treatment plan I advised him to avoid fatty foods is much as possible.  He does like to a barbecue in his backyard.    Aside from the triglycerides I think he is doing just fine.  I will see him again in 1  years time for continued follow-up and we will check his lipids prior to our clinic attendance.        No orders of the defined types were placed in this encounter.      No orders of the defined types were placed in this encounter.      Encounter Diagnoses   Name Primary?    Benign essential hypertension     Hyperlipidemia LDL goal <70     Coronary artery disease involving native heart without angina pectoris, unspecified vessel or lesion type     NSTEMI (non-ST elevated myocardial infarction) (H)        CURRENT MEDICATIONS:  Current Outpatient Medications   Medication Sig Dispense Refill    amLODIPine (NORVASC) 5 MG tablet Take 1 tablet (5 mg) by mouth daily 90 tablet 3    aspirin (ASA) 81 MG EC tablet Take 1 tablet (81 mg) by mouth daily 30 tablet 0    Fexofenadine HCl (ALLEGRA PO) Take 180 mg by mouth At Bedtime       FLONASE INHA 50 MCG/DOSE NA Spray 1 spray in nostril At Bedtime       hydrochlorothiazide (HYDRODIURIL) 25 MG tablet Take 1 tablet (25 mg) by mouth daily 90 tablet 0    ketotifen (ZADITOR/REFRESH ANTI-ITCH) 0.025 % ophthalmic solution Place 1 drop into both eyes At Bedtime      lisinopril (ZESTRIL) 40 MG tablet Take 0.5 tablets (20 mg) by mouth daily 45 tablet 2    metoprolol succinate ER (TOPROL XL) 25 MG 24 hr tablet Take 1 tablet (25 mg) by mouth daily Appointment required for further refills 90 tablet 3    nitroGLYcerin (NITROSTAT) 0.4 MG sublingual tablet For chest pain place 1 tablet under the tongue every 5 minutes for 3 doses. If symptoms persist 5 minutes after 1st dose call 911. 20 tablet 0    rosuvastatin (CRESTOR) 40 MG tablet Take 1 tablet (40 mg) by mouth daily 90 tablet 3       ALLERGIES   No Known Allergies    PAST MEDICAL HISTORY:  Past Medical History:   Diagnosis Date    Benign essential hypertension 04/10/2020    Coronary artery disease involving native coronary artery of native heart without angina pectoris     NSTEMI 1/2020; cardiac cath=medical management    Hyperlipidemia LDL  "goal <70 04/10/2020       PAST SURGICAL HISTORY:  Past Surgical History:   Procedure Laterality Date    CV CORONARY ANGIOGRAM N/A 1/10/2020    Procedure: Coronary Angiogram;  Surgeon: Nico Woodard MD;  Location:  [facility]ARDI[facility] CATH LAB    CV INSTANTANEOUS WAVE-FREE RATIO N/A 1/10/2020    Procedure: Instantaneous Wave-Free Ratio;  Surgeon: Nico Woodard MD;  Location:  HEART CARDI[facility] CATH LAB    CV LEFT HEART CATH N/A 1/10/2020    Procedure: Left Heart Cath;  Surgeon: Nico Woodard MD;  Location:  HE[facility]ARDI[facility] CATH LAB    CV LEFT VENTRICULOGRAM N/A 1/10/2020    Procedure: Left Ventriculogram;  Surgeon: Nico Woodard MD;  Location:  HEART [facility]ARDI[facility] CATH LAB    ENT SURGERY      abcess in jaw and teeth pulled    HERNIORRHAPHY UMBILICAL N/A 10/12/2016    Procedure: HERNIORRHAPHY UMBILICAL;  Surgeon: Parker Alaniz MD;  Location: [facility] OR       FAMILY HISTORY:  Family History   Problem Relation Age of Onset    Thyroid Disease Sister     Heart Disease Maternal Grandmother        SOCIAL HISTORY:  Social History     Socioeconomic History    Marital status:      Spouse name: None    Number of children: None    Years of education: None    Highest education level: None   Tobacco Use    Smoking status: Never    Smokeless tobacco: Never   Substance and Sexual Activity    Alcohol use: Yes     Comment: occ    Drug use: No       Review of Systems:  Skin:  Negative bruising;itching   Eyes:       ENT:       Respiratory:  Negative shortness of breath;cough;wheezing;sleep apnea  Cardiovascular:  Negative;palpitations;chest pain;edema    Gastroenterology:      Genitourinary:       Musculoskeletal:       Neurologic:       Psychiatric:       Heme/Lymph/Imm:       Endocrine:         Physical Exam:  Vitals: /77   Pulse 79   Ht 1.727 m (5' 8\")   Wt 97.1 kg (214 lb 1.6 oz)   SpO2 95%   BMI 32.55 kg/m      Constitutional:           Skin:             Head:           Eyes:       "     Lymph:      ENT:           Neck:           Respiratory:            Cardiac:                                                           GI:           Extremities and Muscular Skeletal:                 Neurological:           Psych:           Recent Lab Results:  LIPID RESULTS:  Lab Results   Component Value Date    CHOL 178 07/31/2023    CHOL 155 04/21/2020    HDL 38 (L) 07/31/2023    HDL 46 04/21/2020    LDL 72 07/31/2023    LDL 62 04/21/2020    TRIG 342 (H) 07/31/2023    TRIG 233 (H) 04/21/2020       LIVER ENZYME RESULTS:  Lab Results   Component Value Date    AST 36 01/10/2020    ALT 63 07/31/2023    ALT 26 04/21/2020       CBC RESULTS:  Lab Results   Component Value Date    WBC 10.0 01/10/2020    RBC 4.65 01/10/2020    HGB 13.9 01/10/2020    HCT 41.6 01/10/2020    MCV 90 01/10/2020    MCH 29.9 01/10/2020    MCHC 33.4 01/10/2020    RDW 13.2 01/10/2020     01/10/2020       BMP RESULTS:  Lab Results   Component Value Date     07/31/2023     01/22/2020    POTASSIUM 3.6 07/31/2023    POTASSIUM 4.1 04/22/2022    POTASSIUM 3.7 01/22/2020    CHLORIDE 104 07/31/2023    CHLORIDE 108 04/22/2022    CHLORIDE 106 01/22/2020    CO2 26 07/31/2023    CO2 31 04/22/2022    CO2 29 01/22/2020    ANIONGAP 13 07/31/2023    ANIONGAP 4 04/22/2022    ANIONGAP 10.7 01/22/2020     (H) 07/31/2023     (H) 04/22/2022    GLC 95 01/22/2020    BUN 18.6 07/31/2023    BUN 17 04/22/2022    BUN 14 01/22/2020    CR 1.07 07/31/2023    CR 1.15 01/22/2020    GFRESTIMATED 83 07/31/2023    GFRESTIMATED 68 01/22/2020    GFRESTBLACK 82 01/22/2020    NANDINI 9.3 07/31/2023    NANDINI 9.3 01/22/2020        A1C RESULTS:  Lab Results   Component Value Date    A1C 5.9 (H) 01/10/2020       INR RESULTS:  No results found for: INR        CC  Alex Wylie, MD  [id number]  [place] [id number]

## 2023-08-29 NOTE — LETTER
1/22/2020    Physician No Ref-Primary  No address on file    RE: Stepan Pablo       Dear Colleague,    I had the pleasure of seeing Stepan Pablo in the [facility].      Cardiology Clinic Progress Note    Stepan Pablo MRN# [id number]   YOB: 1970 Age: 49 year old   Primary cardiologist: Dr. Helton         Assessment and Plan:     In summary, Stepan Pablo presents today for a hospital f/u visit.     1. CAD / NSTEMI, with small-vessel OM disease managing medically - denies angina, tolerating current medications.   2. Hypertension - well controlled at cardiac rehab.  3. Hyperlipidemia - recently started on statin.    Plan:  - No changes today. Will continue current medications and continue to monitor through cardiac rehab.   - Advised mediterranean-style diet and routine cardiovascular exercise regimen for heart health and weight loss.  - Majority of visit spent on education and counseling.     Follow-up:  - Me in 2 months with repeat lipid panel.   - Dr. Helton in 3 months.         History of Presenting Illness:      Stepan Pablo is a pleasant 49 year old patient who presents today for a hospital f/u visit.    The patient has a history of the following -   # CAD  # HTN  # HLP  # Obesity - Body mass index is 31.23 kg/m .   # Never-smoker  # FH - paternal grandfather with CAD, otherwise non-contributory    Stepan had no prior history of CAD before presenting to [facility] on [facility]/10/20 with symptoms suggestive of typical angina, slight ST abnormalities on EKG and troponin rise up to 3. TTE showed a preserved LVEF with mild apical/inferolateral hypokinesis. He underwent coronary angiogram which showed the culprit lesion to be a small lateral branch of a large obtuse marginal. There was otherwise 50% stenosis of the LCX and distal LAD. Medical management was recommended, and DAPT, Crestor, BB and ACEi were initiated. His PTA amlodipine was stopped. Of note, his lipid panel showed a  "significantly elevated LDL of 180.     Today, he presents to clinic stating he's feeling quite well and not limited in any way. Patient denies chest pain, shortness of breath, PND, orthopnea, edema, claudication, palpitations, near syncope or syncope. He thinks the Toprol may be making him slightly groggy but this is a very mild symptom. BP in clinic is mildly elevated but was well-controlled at rehab earlier today.          Review of Systems:     12-pt ROS is negative except for as noted in the HPI.          Physical Exam:     Vitals: BP (!) 143/83   Pulse 56   Ht 1.727 m (5' 8\")   Wt 93.2 kg (205 lb 6.4 oz)   BMI 31.23 kg/m     Wt Readings from Last 10 Encounters:   01/22/20 93.2 kg (205 lb 6.4 oz)   01/10/20 93 kg (205 lb)   10/12/16 95.3 kg (210 lb)   09/19/16 90.7 kg (200 lb)   09/26/10 85.5 kg (188 lb 8 oz)   10/17/06 90.3 kg (199 lb)       Constitutional:  Patient is pleasant, alert, cooperative, and in NAD.  HEENT:  NCAT. PERRLA. EOM's intact.   Neck:  CVP appears normal. No carotid bruits.   Pulmonary: Normal respiratory effort. CTAB.   Cardiac: RRR, normal S1/S2, no S3/S4, no murmur or rub.   Abdomen:  Non-tender abdomen, no hepatosplenomegaly appreciated.   Vascular: Pulses in the upper and lower extremities are 2+ and equal bilaterally. L radial access site C/D/I, no ecchymosis, erythema, bruit appreciated  Extremities: No edema, erythema, cyanosis or tenderness appreciated.  Skin:  No rashes or lesions appreciated.   Neurological:  No gross motor or sensory deficits.   Psych: Appropriate affect.        Data:   Labs reviewed:  Recent Labs   Lab Test 01/10/20  0509   *   HDL 44   NHDL 203*   CHOL 247*   TRIG 116   TSH 1.61       Lab Results   Component Value Date    WBC 10.0 01/10/2020    RBC 4.65 01/10/2020    HGB 13.9 01/10/2020    HCT 41.6 01/10/2020    MCV 90 01/10/2020    MCH 29.9 01/10/2020    MCHC 33.4 01/10/2020    RDW 13.2 01/10/2020     01/10/2020       Lab Results   Component " Value Date     01/22/2020    POTASSIUM 3.7 01/22/2020    CHLORIDE 106 01/22/2020    CO2 29 01/22/2020    ANIONGAP 10.7 01/22/2020    GLC 95 01/22/2020    BUN 14 01/22/2020    CR 1.15 01/22/2020    GFRESTIMATED 68 01/22/2020    GFRESTBLACK 82 01/22/2020    NANDINI 9.3 01/22/2020      Lab Results   Component Value Date    AST 36 01/10/2020    ALT 47 01/10/2020       Lab Results   Component Value Date    A1C 5.9 (H) 01/10/2020       No results found for: INR        Problem List:     Patient Active Problem List   Diagnosis     NSTEMI (non-ST elevated myocardial infarction) (H)           Medications:     Current Outpatient Medications   Medication Sig Dispense Refill     aspirin (ASA) 81 MG EC tablet Take 1 tablet (81 mg) by mouth daily 30 tablet 0     clopidogrel (PLAVIX) 75 MG tablet Take 1 tablet (75 mg) by mouth daily 30 tablet 0     Fexofenadine HCl (ALLEGRA PO) Take 180 mg by mouth At Bedtime        FLONASE INHA 50 MCG/DOSE NA Spray 1 spray in nostril At Bedtime        ketotifen (ZADITOR/REFRESH ANTI-ITCH) 0.025 % ophthalmic solution Place 1 drop into both eyes At Bedtime       lisinopril (PRINIVIL/ZESTRIL) 10 MG tablet Take 1 tablet (10 mg) by mouth daily 30 tablet 0     metoprolol succinate ER (TOPROL-XL) 50 MG 24 hr tablet Take 1 tablet (50 mg) by mouth daily 30 tablet 0     nitroGLYcerin (NITROSTAT) 0.4 MG sublingual tablet For chest pain place 1 tablet under the tongue every 5 minutes for 3 doses. If symptoms persist 5 minutes after 1st dose call 911. 20 tablet 0     rosuvastatin (CRESTOR) 40 MG tablet Take 1 tablet (40 mg) by mouth daily 30 tablet 0           Past Medical History:     Past Medical History:   Diagnosis Date     Hyperlipidemia      Hypertension      Past Surgical History:   Procedure Laterality Date     CV CORONARY ANGIOGRAM N/A 1/10/2020    Procedure: Coronary Angiogram;  Surgeon: Nico Woodard MD;  Location: [facility] CARDIAC CATH LAB     CV INSTANTANEOUS WAVE-FREE RATIO N/A 1/10/2020     Procedure: Instantaneous Wave-Free Ratio;  Surgeon: Nico Woodard MD;  Location:  HEART CARDIAC CATH LAB     CV LEFT HEART CATH N/A 1/10/2020    Procedure: Left Heart Cath;  Surgeon: Nico Woodard MD;  Location:  HEART CARDIAC CATH LAB     CV LEFT VENTRICULOGRAM N/A 1/10/2020    Procedure: Left Ventriculogram;  Surgeon: Nico Woodard MD;  Location:  HEART CARDIAC CATH LAB     ENT SURGERY      abcess in jaw and teeth pulled     HERNIORRHAPHY UMBILICAL N/A 10/12/2016    Procedure: HERNIORRHAPHY UMBILICAL;  Surgeon: Parker Alaniz MD;  Location: [facility] OR     Family History   Problem Relation Age of Onset     Thyroid Disease Sister      Heart Disease Maternal Grandmother      Social History     Socioeconomic History     Marital status:      Spouse name: Not on file     Number of children: Not on file     Years of education: Not on file     Highest education level: Not on file   Occupational History     Not on file   Social Needs     Financial resource strain: Not on file     Food insecurity:     Worry: Not on file     Inability: Not on file     Transportation needs:     Medical: Not on file     Non-medical: Not on file   Tobacco Use     Smoking status: Never Smoker     Smokeless tobacco: Never Used   Substance and Sexual Activity     Alcohol use: Yes     Comment: occ     Drug use: No     Sexual activity: Not on file   Lifestyle     Physical activity:     Days per week: Not on file     Minutes per session: Not on file     Stress: Not on file   Relationships     Social connections:     Talks on phone: Not on file     Gets together: Not on file     Attends Holiness service: Not on file     Active member of club or organization: Not on file     Attends meetings of clubs or organizations: Not on file     Relationship status: Not on file     Intimate partner violence:     Fear of current or ex partner: Not on file     Emotionally abused: Not on file     Physically abused: Not on  file     Forced sexual activity: Not on file   Other Topics Concern     Parent/sibling w/ CABG, MI or angioplasty before 65F 55M? Not Asked   Social History Narrative     Not on file           Allergies:   Patient has no known allergies.      Yasemin Pinzon PA-C  [facility]  Pager: [phone]    Thank you for allowing me to participate in the care of your patient.      Sincerely,     Yasemin Pinzon PA-C     [facility]    cc:   Inez Helton MD  [address]  [place] [id number]         [Negative] : Heme/Lymph

## 2023-10-10 DIAGNOSIS — I21.4 NSTEMI (NON-ST ELEVATED MYOCARDIAL INFARCTION) (H): ICD-10-CM

## 2023-10-10 DIAGNOSIS — E78.5 HYPERLIPIDEMIA LDL GOAL <70: ICD-10-CM

## 2023-10-10 RX ORDER — ROSUVASTATIN CALCIUM 40 MG/1
40 TABLET, COATED ORAL DAILY
Qty: 90 TABLET | Refills: 3 | Status: SHIPPED | OUTPATIENT
Start: 2023-10-10 | End: 2024-03-18 | Stop reason: SINTOL

## 2023-10-10 RX ORDER — ROSUVASTATIN CALCIUM 40 MG/1
40 TABLET, COATED ORAL DAILY
Qty: 90 TABLET | Refills: 3 | Status: SHIPPED | OUTPATIENT
Start: 2023-10-10 | End: 2023-10-10

## 2023-10-13 ENCOUNTER — MYC MEDICAL ADVICE (OUTPATIENT)
Dept: CARDIOLOGY | Facility: CLINIC | Age: 53
End: 2023-10-13
Payer: COMMERCIAL

## 2023-10-13 DIAGNOSIS — I10 ESSENTIAL HYPERTENSION: ICD-10-CM

## 2023-10-13 DIAGNOSIS — I21.4 NSTEMI (NON-ST ELEVATED MYOCARDIAL INFARCTION) (H): ICD-10-CM

## 2023-10-16 RX ORDER — METOPROLOL SUCCINATE 25 MG/1
25 TABLET, EXTENDED RELEASE ORAL DAILY
Qty: 90 TABLET | Refills: 3 | Status: SHIPPED | OUTPATIENT
Start: 2023-10-16

## 2023-10-16 NOTE — TELEPHONE ENCOUNTER
Received message from patient that his [facility] pharmacy is waiting to hear from us regarding refill of Metoprolol.    Writer sees that we got other refill requests last week, but not Metoprolol.      Last OV   8/1/23 with Dr. Wylie    [facility] Refill Guideline reviewed.  Medication meets criteria for refill.    Shanti Barth RN on 10/16/2023 at 10:20 AM

## 2023-10-24 ENCOUNTER — MYC MEDICAL ADVICE (OUTPATIENT)
Dept: CARDIOLOGY | Facility: CLINIC | Age: 53
End: 2023-10-24
Payer: COMMERCIAL

## 2023-10-24 DIAGNOSIS — I10 BENIGN ESSENTIAL HYPERTENSION: ICD-10-CM

## 2023-10-24 RX ORDER — LISINOPRIL 40 MG/1
20 TABLET ORAL DAILY
Qty: 45 TABLET | Refills: 3 | Status: SHIPPED | OUTPATIENT
Start: 2023-10-24

## 2023-10-27 DIAGNOSIS — E78.5 HYPERLIPIDEMIA LDL GOAL <70: ICD-10-CM

## 2023-10-27 DIAGNOSIS — I25.10 CORONARY ARTERY DISEASE INVOLVING NATIVE HEART WITHOUT ANGINA PECTORIS, UNSPECIFIED VESSEL OR LESION TYPE: ICD-10-CM

## 2023-10-27 DIAGNOSIS — I10 BENIGN ESSENTIAL HYPERTENSION: ICD-10-CM

## 2023-10-27 RX ORDER — HYDROCHLOROTHIAZIDE 25 MG/1
25 TABLET ORAL DAILY
Qty: 90 TABLET | Refills: 3 | Status: SHIPPED | OUTPATIENT
Start: 2023-10-27

## 2024-03-01 DIAGNOSIS — I25.10 CORONARY ARTERY DISEASE INVOLVING NATIVE HEART WITHOUT ANGINA PECTORIS, UNSPECIFIED VESSEL OR LESION TYPE: ICD-10-CM

## 2024-03-01 DIAGNOSIS — E78.5 HYPERLIPIDEMIA LDL GOAL <70: ICD-10-CM

## 2024-03-01 DIAGNOSIS — I10 ESSENTIAL HYPERTENSION: ICD-10-CM

## 2024-03-01 RX ORDER — AMLODIPINE BESYLATE 5 MG/1
5 TABLET ORAL DAILY
Qty: 90 TABLET | Refills: 2 | Status: SHIPPED | OUTPATIENT
Start: 2024-03-01

## 2024-03-18 ENCOUNTER — OFFICE VISIT (OUTPATIENT)
Dept: FAMILY MEDICINE | Facility: CLINIC | Age: 54
End: 2024-03-18

## 2024-03-18 VITALS
HEART RATE: 86 BPM | HEIGHT: 69 IN | SYSTOLIC BLOOD PRESSURE: 132 MMHG | OXYGEN SATURATION: 98 % | WEIGHT: 217 LBS | BODY MASS INDEX: 32.14 KG/M2 | DIASTOLIC BLOOD PRESSURE: 92 MMHG

## 2024-03-18 DIAGNOSIS — Z13.1 SCREENING FOR DIABETES MELLITUS: ICD-10-CM

## 2024-03-18 DIAGNOSIS — E66.811 CLASS 1 OBESITY DUE TO EXCESS CALORIES WITH SERIOUS COMORBIDITY AND BODY MASS INDEX (BMI) OF 32.0 TO 32.9 IN ADULT: ICD-10-CM

## 2024-03-18 DIAGNOSIS — I10 BENIGN ESSENTIAL HYPERTENSION: ICD-10-CM

## 2024-03-18 DIAGNOSIS — E66.09 CLASS 1 OBESITY DUE TO EXCESS CALORIES WITH SERIOUS COMORBIDITY AND BODY MASS INDEX (BMI) OF 32.0 TO 32.9 IN ADULT: ICD-10-CM

## 2024-03-18 DIAGNOSIS — I25.10 CORONARY ARTERY DISEASE INVOLVING NATIVE CORONARY ARTERY OF NATIVE HEART WITHOUT ANGINA PECTORIS: Primary | ICD-10-CM

## 2024-03-18 DIAGNOSIS — I25.2 HISTORY OF NON-ST ELEVATION MYOCARDIAL INFARCTION (NSTEMI): ICD-10-CM

## 2024-03-18 PROBLEM — I21.4 NSTEMI (NON-ST ELEVATED MYOCARDIAL INFARCTION) (H): Status: RESOLVED | Noted: 2020-01-10 | Resolved: 2024-03-18

## 2024-03-18 LAB
ALBUMIN SERPL BCG-MCNC: 4.4 G/DL (ref 3.5–5.2)
ALP SERPL-CCNC: 63 U/L (ref 40–150)
ALT SERPL W P-5'-P-CCNC: 46 U/L (ref 0–70)
ANION GAP SERPL CALCULATED.3IONS-SCNC: 14 MMOL/L (ref 7–15)
AST SERPL W P-5'-P-CCNC: 27 U/L (ref 0–45)
BILIRUB SERPL-MCNC: 0.3 MG/DL
BUN SERPL-MCNC: 20.4 MG/DL (ref 6–20)
CALCIUM SERPL-MCNC: 9.7 MG/DL (ref 8.6–10)
CHLORIDE SERPL-SCNC: 103 MMOL/L (ref 98–107)
CHOLEST SERPL-MCNC: 302 MG/DL
CREAT SERPL-MCNC: 1.09 MG/DL (ref 0.67–1.17)
DEPRECATED HCO3 PLAS-SCNC: 25 MMOL/L (ref 22–29)
EGFRCR SERPLBLD CKD-EPI 2021: 82 ML/MIN/1.73M2
FASTING STATUS PATIENT QL REPORTED: YES
GLUCOSE SERPL-MCNC: 110 MG/DL (ref 70–99)
HBA1C MFR BLD: 6 %
HDLC SERPL-MCNC: 33 MG/DL
LDLC SERPL CALC-MCNC: ABNORMAL MG/DL
NONHDLC SERPL-MCNC: 269 MG/DL
POTASSIUM SERPL-SCNC: 3.2 MMOL/L (ref 3.4–5.3)
PROT SERPL-MCNC: 7.5 G/DL (ref 6.4–8.3)
SODIUM SERPL-SCNC: 142 MMOL/L (ref 135–145)
TRIGL SERPL-MCNC: 465 MG/DL

## 2024-03-18 PROCEDURE — 80053 COMPREHEN METABOLIC PANEL: CPT | Performed by: FAMILY MEDICINE

## 2024-03-18 PROCEDURE — 80061 LIPID PANEL: CPT | Mod: ORL | Performed by: FAMILY MEDICINE

## 2024-03-18 PROCEDURE — 80053 COMPREHEN METABOLIC PANEL: CPT | Mod: ORL | Performed by: FAMILY MEDICINE

## 2024-03-18 PROCEDURE — 36415 COLL VENOUS BLD VENIPUNCTURE: CPT | Performed by: FAMILY MEDICINE

## 2024-03-18 PROCEDURE — 83036 HEMOGLOBIN GLYCOSYLATED A1C: CPT | Mod: ORL | Performed by: FAMILY MEDICINE

## 2024-03-18 PROCEDURE — 83036 HEMOGLOBIN GLYCOSYLATED A1C: CPT | Performed by: FAMILY MEDICINE

## 2024-03-18 PROCEDURE — 99204 OFFICE O/P NEW MOD 45 MIN: CPT | Performed by: FAMILY MEDICINE

## 2024-03-18 RX ORDER — ATORVASTATIN CALCIUM 40 MG/1
40 TABLET, FILM COATED ORAL DAILY
Qty: 90 TABLET | Refills: 3 | Status: SHIPPED | OUTPATIENT
Start: 2024-03-18

## 2024-03-18 NOTE — PROGRESS NOTES
"  Karson Ying is a 53 year old patient who presents to clinic to establish care.    CAD: NSTEMI in 2020.  Cath with mild to moderate multivessel disease and culprit lesion felt to be small lateral branch of large obtuse marginal.  He was treated medically.  He was on Rosuvastatin until a few months ago when he stopped due to leg heaviness and fatigue.  His symptoms resolved.  He follows with Dr Wylie.  No chest pain, SOB    HTN: historically difficult to control on multi-drug regimen.  Secondary HTN workup was negative.  On amlodipine, hydrochlorothiazide, lisinopril and metoprolol.        Review of Systems   Constitutional, HEENT, cardiovascular, pulmonary, GI, , musculoskeletal, neuro, skin, endocrine and psych systems are negative, except as otherwise noted.      Objective    BP (!) 132/92   Pulse 86   Ht 1.74 m (5' 8.5\")   Wt 98.4 kg (217 lb)   SpO2 98%   BMI 32.51 kg/m      General: Well appearing, NAD  HEENT: Clear  Heart: RRR, no murmur  Chest: Lungs CTAB  Skin: Clear  MSK: No edema  Psych: normal mood and affect        No results found for this or any previous visit (from the past 24 hour(s)).    Coronary artery disease involving native coronary artery of native heart without angina pectoris  Asymptomatic but came off statin.  Recheck lipids off statin.  Trial of atorvastatin and encouraged follow up with Dr Wylie.  If unable to tolerate may need to consider alternatives such as PSK9i.  - atorvastatin (LIPITOR) 40 MG tablet; Take 1 tablet (40 mg) by mouth daily  - Comprehensive metabolic panel; Future  - VENOUS COLLECTION  - Comprehensive metabolic panel  - Lipid Profile; Future  - Lipid Profile    Benign essential hypertension  Mildly elevated today but reports has been at goal.  Cont current meds and recommend cardiology follow up  Weight loss, diet exercise  - VENOUS COLLECTION    History of non-ST elevation myocardial infarction (NSTEMI)  - VENOUS COLLECTION    Class 1 obesity due to excess " calories with serious comorbidity and body mass index (BMI) of 32.0 to 32.9 in adult  - Discussed importance of optimizing diet, exercise and healthy weight  - Hemoglobin A1c; Future  - VENOUS COLLECTION  - Hemoglobin A1c    Screening for diabetes mellitus  - Hemoglobin A1c; Future  - VENOUS COLLECTION  - Hemoglobin A1c    Follow up in 6 months for CPE          Answers submitted by the patient for this visit:  General Questionnaire (Submitted on 3/15/2024)  Chief Complaint: Chronic problems general questions HPI Form  What is the reason for your visit today? : My dentist, cardiologist, eye doctor etc. are always asking who my GP is, so I thought I should finally get myself one.  How many servings of fruits and vegetables do you eat daily?: 2-3  On average, how many sweetened beverages do you drink each day (Examples: soda, juice, sweet tea, etc.  Do NOT count diet or artificially sweetened beverages)?: 0  How many minutes a day do you exercise enough to make your heart beat faster?: 30 to 60  How many days a week do you exercise enough to make your heart beat faster?: 4  How many days per week do you miss taking your medication?: 0

## 2024-03-20 NOTE — RESULT ENCOUNTER NOTE
"Dear Stepan,     I had a \"mouse error\" and hit send on the last note before I was done editing the results,  Your LDL cholesterol is unreadable on this result with the total cholesterol at 302. So good idea with your previous heart stuff to be on the new Atorvastatin. I hope you tolerate it better than the previous statin.  Good idea to recheck levels in 6 months if you are tolerating the medication.      Sony Vallejo MD  "

## 2024-03-20 NOTE — RESULT ENCOUNTER NOTE
Dear Stepan,     I am reviewing result from your visit with Dr. Engle while he is currently unavailable.   The included test results from your recent visit are within normal ranges. I do not suggest that we make any changes at this time.    Sony Vallejo M.D.

## 2024-06-22 ENCOUNTER — HEALTH MAINTENANCE LETTER (OUTPATIENT)
Age: 54
End: 2024-06-22

## 2024-10-07 DIAGNOSIS — E78.5 HYPERLIPIDEMIA LDL GOAL <70: ICD-10-CM

## 2024-10-07 DIAGNOSIS — I10 BENIGN ESSENTIAL HYPERTENSION: ICD-10-CM

## 2024-10-07 DIAGNOSIS — I25.10 CORONARY ARTERY DISEASE INVOLVING NATIVE HEART WITHOUT ANGINA PECTORIS, UNSPECIFIED VESSEL OR LESION TYPE: ICD-10-CM

## 2024-10-07 DIAGNOSIS — I21.4 NSTEMI (NON-ST ELEVATED MYOCARDIAL INFARCTION) (H): ICD-10-CM

## 2024-10-07 DIAGNOSIS — I10 ESSENTIAL HYPERTENSION: ICD-10-CM

## 2024-10-07 RX ORDER — HYDROCHLOROTHIAZIDE 25 MG/1
25 TABLET ORAL DAILY
Qty: 90 TABLET | Refills: 0 | Status: SHIPPED | OUTPATIENT
Start: 2024-10-07

## 2024-10-07 RX ORDER — METOPROLOL SUCCINATE 25 MG/1
25 TABLET, EXTENDED RELEASE ORAL DAILY
Qty: 90 TABLET | Refills: 0 | Status: SHIPPED | OUTPATIENT
Start: 2024-10-07

## 2024-10-25 DIAGNOSIS — I10 BENIGN ESSENTIAL HYPERTENSION: ICD-10-CM

## 2024-10-25 RX ORDER — LISINOPRIL 40 MG/1
20 TABLET ORAL DAILY
Qty: 45 TABLET | Refills: 1 | Status: SHIPPED | OUTPATIENT
Start: 2024-10-25

## 2024-12-05 ENCOUNTER — TELEPHONE (OUTPATIENT)
Dept: CARDIOLOGY | Facility: CLINIC | Age: 54
End: 2024-12-05
Payer: COMMERCIAL

## 2024-12-05 DIAGNOSIS — E78.5 HYPERLIPIDEMIA LDL GOAL <70: Primary | ICD-10-CM

## 2024-12-05 NOTE — TELEPHONE ENCOUNTER
[facility] Call Center    Phone Message    May a detailed message be left on voicemail: yes     Reason for Call: Order(s): Other:   Reason for requested: Labs  Date needed: 25  Provider name: Dr. Wylie    Wife called to r/s labs. Orders have . Please place new lab orders. Thank you     Action Taken: Other: cardiology     Travel Screening: Not Applicable    Thank you!  [facility]       Date of Service:

## 2024-12-05 NOTE — TELEPHONE ENCOUNTER
Follow-up cardiology order extended  New lab order placed    Estefani Lowery RN on 12/5/2024 at 10:34 AM

## 2025-01-13 DIAGNOSIS — I25.10 CORONARY ARTERY DISEASE INVOLVING NATIVE HEART WITHOUT ANGINA PECTORIS, UNSPECIFIED VESSEL OR LESION TYPE: ICD-10-CM

## 2025-01-13 DIAGNOSIS — I10 BENIGN ESSENTIAL HYPERTENSION: ICD-10-CM

## 2025-01-13 DIAGNOSIS — E78.5 HYPERLIPIDEMIA LDL GOAL <70: ICD-10-CM

## 2025-01-13 DIAGNOSIS — I21.4 NSTEMI (NON-ST ELEVATED MYOCARDIAL INFARCTION) (H): ICD-10-CM

## 2025-01-13 DIAGNOSIS — I10 ESSENTIAL HYPERTENSION: ICD-10-CM

## 2025-01-13 RX ORDER — METOPROLOL SUCCINATE 25 MG/1
25 TABLET, EXTENDED RELEASE ORAL DAILY
Qty: 90 TABLET | Refills: 0 | Status: SHIPPED | OUTPATIENT
Start: 2025-01-13

## 2025-01-13 RX ORDER — LISINOPRIL 40 MG/1
20 TABLET ORAL DAILY
Qty: 45 TABLET | Refills: 0 | Status: SHIPPED | OUTPATIENT
Start: 2025-01-13

## 2025-01-13 RX ORDER — HYDROCHLOROTHIAZIDE 25 MG/1
25 TABLET ORAL DAILY
Qty: 90 TABLET | Refills: 0 | Status: SHIPPED | OUTPATIENT
Start: 2025-01-13

## 2025-07-12 ENCOUNTER — HEALTH MAINTENANCE LETTER (OUTPATIENT)
Age: 55
End: 2025-07-12

## 2025-07-14 DIAGNOSIS — I21.4 NSTEMI (NON-ST ELEVATED MYOCARDIAL INFARCTION) (H): ICD-10-CM

## 2025-07-14 DIAGNOSIS — I10 BENIGN ESSENTIAL HYPERTENSION: ICD-10-CM

## 2025-07-14 DIAGNOSIS — I10 ESSENTIAL HYPERTENSION: ICD-10-CM

## 2025-07-14 DIAGNOSIS — I25.10 CORONARY ARTERY DISEASE INVOLVING NATIVE HEART WITHOUT ANGINA PECTORIS, UNSPECIFIED VESSEL OR LESION TYPE: ICD-10-CM

## 2025-07-14 DIAGNOSIS — E78.5 HYPERLIPIDEMIA LDL GOAL <70: ICD-10-CM

## 2025-07-14 RX ORDER — HYDROCHLOROTHIAZIDE 25 MG/1
25 TABLET ORAL DAILY
Qty: 90 TABLET | Refills: 1 | Status: SHIPPED | OUTPATIENT
Start: 2025-07-14

## 2025-07-14 RX ORDER — LISINOPRIL 40 MG/1
20 TABLET ORAL DAILY
Qty: 45 TABLET | Refills: 1 | Status: SHIPPED | OUTPATIENT
Start: 2025-07-14

## 2025-07-14 RX ORDER — METOPROLOL SUCCINATE 25 MG/1
25 TABLET, EXTENDED RELEASE ORAL DAILY
Qty: 90 TABLET | Refills: 1 | Status: SHIPPED | OUTPATIENT
Start: 2025-07-14

## 2025-07-14 RX ORDER — AMLODIPINE BESYLATE 5 MG/1
5 TABLET ORAL DAILY
Qty: 90 TABLET | Refills: 1 | Status: SHIPPED | OUTPATIENT
Start: 2025-07-14

## (undated) DEVICE — CATH ANGIO INFINITI PIGTAIL 145 6 SH 6FRX110CM  534-652S

## (undated) DEVICE — DEFIB PRO-PADZ LVP LQD GEL ADULT 8900-2105-01

## (undated) DEVICE — DRAPE STERI FLUOROSCOPE 35X43"1012 LATEX FREE

## (undated) DEVICE — MANIFOLD KIT ANGIO AUTOMATED 014613

## (undated) DEVICE — WIRE GUIDE 0.035"X260CM SAFE-T-J EXCHANGE G00517

## (undated) DEVICE — INFL DVC KIT W/10CC NITRO IN4530

## (undated) DEVICE — TOTE ANGIO CORP PC15AT SAN32CC83O

## (undated) DEVICE — CATH ANGIO JUDKINS JL4 6FRX100CM INFINITI 534620T

## (undated) DEVICE — INTRO GLIDESHEATH SLENDER 6FR 10X45CM 60-1060

## (undated) DEVICE — KIT HAND CONTROL ANGIOTOUCH ACIST 65CM AT-P65

## (undated) DEVICE — CATH ANGIO INFINITI 3DRC 6FRX100CM 534676T

## (undated) DEVICE — GUIDEWIRE VERRATA PLUS PRESSURE 185CM JTIP 10185JP

## (undated) RX ORDER — FENTANYL CITRATE 50 UG/ML
INJECTION, SOLUTION INTRAMUSCULAR; INTRAVENOUS
Status: DISPENSED
Start: 2020-01-10

## (undated) RX ORDER — CLOPIDOGREL 300 MG/1
TABLET, FILM COATED ORAL
Status: DISPENSED
Start: 2020-01-10

## (undated) RX ORDER — HEPARIN SODIUM 1000 [USP'U]/ML
INJECTION, SOLUTION INTRAVENOUS; SUBCUTANEOUS
Status: DISPENSED
Start: 2020-01-10

## (undated) RX ORDER — NITROGLYCERIN 5 MG/ML
VIAL (ML) INTRAVENOUS
Status: DISPENSED
Start: 2020-01-10

## (undated) RX ORDER — HEPARIN SODIUM 200 [USP'U]/100ML
INJECTION, SOLUTION INTRAVENOUS
Status: DISPENSED
Start: 2020-01-10

## (undated) RX ORDER — LIDOCAINE HYDROCHLORIDE 10 MG/ML
INJECTION, SOLUTION EPIDURAL; INFILTRATION; INTRACAUDAL; PERINEURAL
Status: DISPENSED
Start: 2020-01-10

## (undated) RX ORDER — VERAPAMIL HYDROCHLORIDE 2.5 MG/ML
INJECTION, SOLUTION INTRAVENOUS
Status: DISPENSED
Start: 2020-01-10